# Patient Record
Sex: MALE | Race: WHITE | NOT HISPANIC OR LATINO | Employment: FULL TIME | URBAN - METROPOLITAN AREA
[De-identification: names, ages, dates, MRNs, and addresses within clinical notes are randomized per-mention and may not be internally consistent; named-entity substitution may affect disease eponyms.]

---

## 2017-03-06 DIAGNOSIS — J20.9 ACUTE BRONCHITIS WITH SYMPTOMS > 10 DAYS: ICD-10-CM

## 2017-03-06 NOTE — TELEPHONE ENCOUNTER
albuterol       Last Written Prescription Date: 01/11/16  Last Fill Quantity: 1 inhaler, # refills: 11    Last Office Visit with G, P or [facility] prescribing provider:  07/06/16   Future Office Visit:       Date of Last Asthma Action Plan Letter:   There are no preventive care reminders to display for this patient.   Asthma Control Test: No flowsheet data found.    Date of Last Spirometry Test:   No results found for this or any previous visit.

## 2017-03-06 NOTE — TELEPHONE ENCOUNTER
Routing refill request to provider for review/approval because:  Drug not on the [facility] refill protocol  For diagnosis listed

## 2017-03-08 RX ORDER — ALBUTEROL SULFATE 90 UG/1
1-2 AEROSOL, METERED RESPIRATORY (INHALATION) EVERY 4 HOURS PRN
Qty: 1 INHALER | Refills: 11 | Status: SHIPPED | OUTPATIENT
Start: 2017-03-08 | End: 2018-07-13

## 2017-05-05 ENCOUNTER — TRANSFERRED RECORDS (OUTPATIENT)
Dept: HEALTH INFORMATION MANAGEMENT | Facility: CLINIC | Age: 57
End: 2017-05-05

## 2017-06-25 DIAGNOSIS — E78.1 HYPERTRIGLYCERIDEMIA: ICD-10-CM

## 2017-06-26 NOTE — TELEPHONE ENCOUNTER
fenofibrate 160 MG tablet       Last Written Prescription Date: 7/06/2016  Last Fill Quantity: 90, # refills: 3    Last Office Visit with G, P or [facility] prescribing provider:  7/06/2016   Future Office Visit:    Next 5 appointments (look out 90 days)     Jul 10, 2017  9:00 AM CDT   PHYSICAL with Jose Maxwell MD   [facility]    [address] [place] [address] [phone]                  Cholesterol   Date Value Ref Range Status   07/06/2016 150 <200 mg/dL Final     HDL Cholesterol   Date Value Ref Range Status   07/06/2016 60 >39 mg/dL Final     LDL Cholesterol Calculated   Date Value Ref Range Status   07/06/2016 73 <100 mg/dL Final     Comment:     Desirable:       <100 mg/dl     Triglycerides   Date Value Ref Range Status   07/06/2016 83 <150 mg/dL Final     Cholesterol/HDL Ratio   Date Value Ref Range Status   09/04/2015 3.0 0.0 - 5.0 Final     ALT   Date Value Ref Range Status   07/06/2016 33 0 - 70 U/L Final

## 2017-06-27 RX ORDER — FENOFIBRATE 160 MG/1
TABLET ORAL
Qty: 90 TABLET | Refills: 0 | Status: SHIPPED | OUTPATIENT
Start: 2017-06-27 | End: 2017-07-10

## 2017-06-29 DIAGNOSIS — E11.9 TYPE 2 DIABETES MELLITUS WITHOUT COMPLICATION, WITHOUT LONG-TERM CURRENT USE OF INSULIN (H): Primary | ICD-10-CM

## 2017-06-29 NOTE — TELEPHONE ENCOUNTER
Routing refill request to provider for review/approval because:  Labs not current:  a1c  Patient needs to be seen because it has been more than 1 year since last office visit.  Does have upcoming appt

## 2017-06-29 NOTE — TELEPHONE ENCOUNTER
metFORMIN (GLUCOPHAGE) 1000 MG tablet         Last Written Prescription Date: 7/06/2016  Last Fill Quantity: 180, # refills: 3  Last Office Visit with [facility], [facility] or  [facility] prescribing provider:  7/06/2016   Next 5 appointments (look out 90 days)     Jul 10, 2017  9:00 AM CDT   PHYSICAL with Jose Maxwell MD   [facility] ([facility])    [address] [place] [address] [phone]                   BP Readings from Last 3 Encounters:   07/06/16 126/78   11/05/15 146/89   09/09/15 132/62     Lab Results   Component Value Date    MICROL 6 07/06/2016     Lab Results   Component Value Date    UMALCR 7.05 07/06/2016     Creatinine   Date Value Ref Range Status   07/06/2016 0.86 0.66 - 1.25 mg/dL Final   ]  GFR Estimate   Date Value Ref Range Status   07/06/2016 >90  Non  GFR Calc   >60 mL/min/1.7m2 Final   09/04/2015 88 >60 mL/min/1.7m2 Final     Comment:     Non  GFR Calc   04/17/2015 >90  Non  GFR Calc   >60 mL/min/1.7m2 Final     GFR Estimate If Black   Date Value Ref Range Status   07/06/2016 >90   GFR Calc   >60 mL/min/1.7m2 Final   09/04/2015 >90   GFR Calc   >60 mL/min/1.7m2 Final   04/17/2015 >90   GFR Calc   >60 mL/min/1.7m2 Final     Lab Results   Component Value Date    CHOL 150 07/06/2016     Lab Results   Component Value Date    HDL 60 07/06/2016     Lab Results   Component Value Date    LDL 73 07/06/2016     Lab Results   Component Value Date    TRIG 83 07/06/2016     Lab Results   Component Value Date    CHOLHDLRATIO 3.0 09/04/2015     Lab Results   Component Value Date    AST 18 07/06/2016     Lab Results   Component Value Date    ALT 33 07/06/2016     Lab Results   Component Value Date    A1C 6.1 07/06/2016    A1C 6.0 09/04/2015    A1C 6.3 04/17/2015    A1C 6.1 06/11/2014    A1C 7.6 03/03/2014     Potassium   Date Value Ref Range Status    07/06/2016 4.8 3.4 - 5.3 mmol/L Final

## 2017-07-10 ENCOUNTER — OFFICE VISIT (OUTPATIENT)
Dept: INTERNAL MEDICINE | Facility: CLINIC | Age: 57
End: 2017-07-10
Payer: COMMERCIAL

## 2017-07-10 VITALS
SYSTOLIC BLOOD PRESSURE: 128 MMHG | DIASTOLIC BLOOD PRESSURE: 76 MMHG | OXYGEN SATURATION: 98 % | TEMPERATURE: 98.6 F | WEIGHT: 149.5 LBS | HEIGHT: 67 IN | HEART RATE: 81 BPM | BODY MASS INDEX: 23.46 KG/M2

## 2017-07-10 DIAGNOSIS — Z00.00 ENCOUNTER FOR ROUTINE ADULT HEALTH EXAMINATION WITHOUT ABNORMAL FINDINGS: Primary | ICD-10-CM

## 2017-07-10 DIAGNOSIS — E78.1 HYPERTRIGLYCERIDEMIA: ICD-10-CM

## 2017-07-10 DIAGNOSIS — E11.9 TYPE 2 DIABETES MELLITUS WITHOUT COMPLICATION, WITHOUT LONG-TERM CURRENT USE OF INSULIN (H): ICD-10-CM

## 2017-07-10 DIAGNOSIS — Z12.5 SCREENING FOR PROSTATE CANCER: ICD-10-CM

## 2017-07-10 LAB
CREAT UR-MCNC: 77 MG/DL
HBA1C MFR BLD: 6 % (ref 4.3–6)
MICROALBUMIN UR-MCNC: 6 MG/L
MICROALBUMIN/CREAT UR: 8.12 MG/G CR (ref 0–17)

## 2017-07-10 PROCEDURE — 82043 UR ALBUMIN QUANTITATIVE: CPT | Performed by: INTERNAL MEDICINE

## 2017-07-10 PROCEDURE — 80061 LIPID PANEL: CPT | Performed by: INTERNAL MEDICINE

## 2017-07-10 PROCEDURE — 99396 PREV VISIT EST AGE 40-64: CPT | Performed by: INTERNAL MEDICINE

## 2017-07-10 PROCEDURE — 84443 ASSAY THYROID STIM HORMONE: CPT | Performed by: INTERNAL MEDICINE

## 2017-07-10 PROCEDURE — G0103 PSA SCREENING: HCPCS | Performed by: INTERNAL MEDICINE

## 2017-07-10 PROCEDURE — 36415 COLL VENOUS BLD VENIPUNCTURE: CPT | Performed by: INTERNAL MEDICINE

## 2017-07-10 PROCEDURE — 80053 COMPREHEN METABOLIC PANEL: CPT | Performed by: INTERNAL MEDICINE

## 2017-07-10 PROCEDURE — 83036 HEMOGLOBIN GLYCOSYLATED A1C: CPT | Performed by: INTERNAL MEDICINE

## 2017-07-10 RX ORDER — FENOFIBRATE 160 MG/1
160 TABLET ORAL DAILY
Qty: 90 TABLET | Refills: 3 | Status: SHIPPED | OUTPATIENT
Start: 2017-07-10 | End: 2018-09-30

## 2017-07-10 NOTE — MR AVS SNAPSHOT
After Visit Summary   7/10/2017    Lencho Patel    MRN: [id number]           Patient Information     Date Of Birth          1960        Visit Information        Provider Department      7/10/2017 9:00 AM Jose Maxwell MD [facility]        Today's Diagnoses     Encounter for routine adult health examination without abnormal findings    -  1    Hypertriglyceridemia        Type 2 diabetes mellitus without complication, without long-term current use of insulin (H)        Screening for prostate cancer          Care Instructions      Preventive Health Recommendations  Male Ages 50 - 64    Yearly exam:             See your health care provider every year in order to  o   Review health changes.   o   Discuss preventive care.    o   Review your medicines if your doctor has prescribed any.     Have a cholesterol test every 5 years, or more frequently if you are at risk for high cholesterol/heart disease.     Have a diabetes test (fasting glucose) every three years. If you are at risk for diabetes, you should have this test more often.     Have a colonoscopy at age 50, or have a yearly FIT test (stool test). These exams will check for colon cancer.      Talk with your health care provider about whether or not a prostate cancer screening test (PSA) is right for you.    You should be tested each year for STDs (sexually transmitted diseases), if you re at risk.     Shots: Get a flu shot each year. Get a tetanus shot every 10 years.     Nutrition:    Eat at least 5 servings of fruits and vegetables daily.     Eat whole-grain bread, whole-wheat pasta and brown rice instead of white grains and rice.     Talk to your provider about Calcium and Vitamin D.     Lifestyle    Exercise for at least 150 minutes a week (30 minutes a day, 5 days a week). This will help you control your weight and prevent disease.     Limit alcohol to one drink per day.     No smoking.     Wear sunscreen to  "prevent skin cancer.     See your dentist every six months for an exam and cleaning.     See your eye doctor every 1 to 2 years.            Follow-ups after your visit        Who to contact     If you have questions or need follow up information about today's clinic visit or your schedule please contact [facility] directly at [phone].  Normal or non-critical lab and imaging results will be communicated to you by MyChart, letter or phone within 4 business days after the clinic has received the results. If you do not hear from us within 7 days, please contact the clinic through [employer] or phone. If you have a critical or abnormal lab result, we will notify you by phone as soon as possible.  Submit refill requests through [employer] or call your pharmacy and they will forward the refill request to us. Please allow 3 business days for your refill to be completed.          Additional Information About Your Visit        [employer] Information     [employer] lets you send messages to your doctor, view your test results, renew your prescriptions, schedule appointments and more. To sign up, go to [email or web address] . Click on \"Log in\" on the left side of the screen, which will take you to the Welcome page. Then click on \"Sign up Now\" on the right side of the page.     You will be asked to enter the access code listed below, as well as some personal information. Please follow the directions to create your username and password.     Your access code is: [email or web address]  Expires: 10/8/2017  9:51 AM     Your access code will  in 90 days. If you need help or a new code, please call your [place] clinic or [phone].        Care EveryWhere ID     This is your Care EveryWhere ID. This could be used by other organizations to access your [place] medical records  [id number]        Your Vitals Were     Pulse Temperature Height Pulse Oximetry BMI (Body Mass Index)       81 98.6  F (37  C) (Oral) 5' 7\" " (1.702 m) 98% 23.42 kg/m2        Blood Pressure from Last 3 Encounters:   07/10/17 128/76   07/06/16 126/78   11/05/15 146/89    Weight from Last 3 Encounters:   07/10/17 149 lb 8 oz (67.8 kg)   07/06/16 153 lb (69.4 kg)   11/05/15 158 lb (71.7 kg)              We Performed the Following     Albumin Random Urine Quantitative     Comprehensive metabolic panel (BMP + Alb, Alk Phos, ALT, AST, Total. Bili, TP)     HEMOGLOBIN A1C     Lipid Profile with reflex to direct LDL     PSA, screen     TSH with free T4 reflex          Today's Medication Changes          These changes are accurate as of: 7/10/17  9:51 AM.  If you have any questions, ask your nurse or doctor.               These medicines have changed or have updated prescriptions.        Dose/Directions    fenofibrate 160 MG tablet   This may have changed:  See the new instructions.   Used for:  Hypertriglyceridemia   Changed by:  Jose Maxwell MD        Dose:  160 mg   Take 1 tablet (160 mg) by mouth daily   Quantity:  90 tablet   Refills:  3         Stop taking these medicines if you haven't already. Please contact your care team if you have questions.     nystatin [id number] UNIT/ML suspension   Commonly known as:  MYCOSTATIN   Stopped by:  Jose Maxwell MD                Where to get your medicines      These medications were sent to [facility] [id number] [place] [address] [address] AT [facility] [address]  [address], [facility] [address]     Phone:  [phone]     fenofibrate 160 MG tablet                Primary Care Provider Office Phone # Fax #    Jose Maxwell [phone]       [facility] [address]  [facility] [address]        Equal Access to Services     MIGDALIA SALINAS AH: Hadii dima richeyo Socolin, waaxda luqadaha, qaybta kaalmada adeegyada, taqueria cedillo. So [facility] [phone].    ATENCIÓN: Si soto grubbs garnica  disposición servicios gratuitos de asistencia lingüística. Rico luna [phone].    We comply with applicable federal civil rights laws and [place] laws. We do not discriminate on the basis of race, color, national origin, age, disability sex, sexual orientation or gender identity.            Thank you!     Thank you for choosing [facility]  for your care. Our goal is always to provide you with excellent care. Hearing back from our patients is one way we can continue to improve our services. Please take a few minutes to complete the written survey that you may receive in the mail after your visit with us. Thank you!             Your Updated Medication List - Protect others around you: Learn how to safely use, store and throw away your medicines at www.disposemymeds.org.          This list is accurate as of: 7/10/17  9:51 AM.  Always use your most recent med list.                   Brand Name Dispense Instructions for use Diagnosis    * ACE NOT PRESCRIBED (INTENTIONAL)     0 each    1 each ACE Inhibitor not prescribed due to Other:        albuterol 108 (90 BASE) MCG/ACT Inhaler    PROAIR HFA/PROVENTIL HFA/VENTOLIN HFA    1 Inhaler    Inhale 1-2 puffs into the lungs every 4 hours as needed for shortness of breath / dyspnea    Acute bronchitis with symptoms > 10 days       aspirin 81 MG tablet     30 tablet    Take 1 tablet (81 mg) by mouth daily        blood glucose calibration solution     1 each    1 Bottle by In Vitro route as needed    Type 2 diabetes, HbA1c goal < 7% (H)       blood glucose monitoring lancets     1 each    1 each daily    Type 2 diabetes, HbA1c goal < 7% (H)       blood glucose monitoring test strip    ACCU-CHEK SMARTVIEW    1 Box    1 strip by In Vitro route 2 times daily    Type 2 diabetes, HbA1c goal < 7% (H)       fenofibrate 160 MG tablet     90 tablet    Take 1 tablet (160 mg) by mouth daily    Hypertriglyceridemia       metFORMIN 1000 MG tablet    GLUCOPHAGE     180 tablet    Take 1 tablet (1,000 mg) by mouth 2 times daily (with meals)    Type 2 diabetes mellitus without complication, without long-term current use of insulin (H)       * STATIN NOT PRESCRIBED (INTENTIONAL)     0 each    Statin not prescribed intentionally due to Other:        * Notice:  This list has 2 medication(s) that are the same as other medications prescribed for you. Read the directions carefully, and ask your doctor or other care provider to review them with you.

## 2017-07-10 NOTE — LETTER
[address]  [address]  [phone]        Lencho Jorge  [address] [place]  [facility] [address]      7/12/2017        Dear Mr. Patel:    I am writing to inform you of the results of the laboratory tests you had done recently.    Total Cholesterol:   Lab Results   Component Value Date    CHOL 156 07/10/2017          (Recommended: below 200)        HDL (good) Cholesterol :   Lab Results   Component Value Date    HDL 62 07/10/2017         (Recommended: 40 or more)  LDL (bad) Cholesterol:    Lab Results   Component Value Date    LDL 76 07/10/2017          (Recommended: below 130, below 100 if heart disease or diabetes is diagnosed)   Triglycerides:    Lab Results   Component Value Date    TRIG 92 07/10/2017       (Recommended: below 180)    Slight variations and daily fluctuations are normal and should cause little concern. Overall patterns and values are most significant.  An elevated cholesterol is one factor in increasing your risk of heart and vascular disease.    Additional results of your recent labs are as noted.   Liver function: NORMAL  Kidney function: NORMAL  PSA: NORMAL  Thyroid function: NORMAL  Electrolytes: NORMAL  Glucose: STABLE FROM PREVIOUSLY  Hgb A1C: NORMAL      All of your lab testing looks good.    Thank you for allowing me to participate in your care. If you have any further questions or problems, please contact me via our nurse line at [phone].    Sincerely,        Chuck Maxwell M.D.  Department of Internal Medicine  [facility]

## 2017-07-10 NOTE — NURSING NOTE
"Chief Complaint   Patient presents with     Physical       Initial /76 (BP Location: Left arm, Patient Position: Chair, Cuff Size: Adult Regular)  Pulse 81  Temp 98.6  F (37  C) (Oral)  Ht 5' 7\" (1.702 m)  Wt 149 lb 8 oz (67.8 kg)  SpO2 98%  BMI 23.42 kg/m2 Estimated body mass index is 23.42 kg/(m^2) as calculated from the following:    Height as of this encounter: 5' 7\" (1.702 m).    Weight as of this encounter: 149 lb 8 oz (67.8 kg).  Medication Reconciliation: complete    "

## 2017-07-10 NOTE — PROGRESS NOTES
SUBJECTIVE:   CC: Lencho Patel is an 57 year old male who presents for preventative health visit.     Healthy Habits:    Do you get at least three servings of calcium containing foods daily (dairy, green leafy vegetables, etc.)? yes    Amount of exercise or daily activities, outside of work: 5 day(s) per week    Problems taking medications regularly No    Medication side effects: No    Have you had an eye exam in the past two years? no    Do you see a dentist twice per year? yes    Do you have sleep apnea, excessive snoring or daytime drowsiness?yes            Today's PHQ-2 Score:   PHQ-2 ( 1999 [employer]) 7/10/2017 7/6/2016   Q1: Little interest or pleasure in doing things 0 0   Q2: Feeling down, depressed or hopeless 0 0   PHQ-2 Score 0 0       Abuse: Current or Past(Physical, Sexual or Emotional)- No  Do you feel safe in your environment - Yes    Social History   Substance Use Topics     Smoking status: Former Smoker     Packs/day: 0.50     Years: 15.00     Types: Cigarettes     Quit date: 2/19/2014     Smokeless tobacco: Never Used     Alcohol use 0.0 oz/week     0 Standard drinks or equivalent per week      Comment: Socially     The patient does not drink >3 drinks per day nor >7 drinks per week.    Last PSA:   PSA   Date Value Ref Range Status   04/17/2015 0.49 0 - 4 ug/L Final       Reviewed orders with patient. Reviewed health maintenance and updated orders accordingly - Yes      Reviewed and updated as needed this visit by clinical staff  Tobacco  Allergies  Med Hx  Surg Hx  Fam Hx  Soc Hx        Reviewed and updated as needed this visit by Provider          Patient continues on metformin monotherapy for type 2 diabetes, as well as fenofibrate for hypertriglyceridemia.  He is due for surveillance labs today and has been fasting.  No other acute issues to discuss today.      ROS:  C: NEGATIVE for fever, chills, change in weight  I: NEGATIVE for worrisome rashes, moles or lesions  E: NEGATIVE for vision  "changes or irritation  ENT: NEGATIVE for ear, mouth and throat problems  R: NEGATIVE for significant cough or SOB  CV: NEGATIVE for chest pain, palpitations or peripheral edema  GI: NEGATIVE for nausea, abdominal pain, heartburn, or change in bowel habits   male: negative for dysuria, hematuria, decreased urinary stream, erectile dysfunction, urethral discharge  M: NEGATIVE for significant arthralgias or myalgia  N: NEGATIVE for weakness, dizziness or paresthesias  P: NEGATIVE for changes in mood or affect    OBJECTIVE:   /76 (BP Location: Left arm, Patient Position: Chair, Cuff Size: Adult Regular)  Pulse 81  Temp 98.6  F (37  C) (Oral)  Ht 5' 7\" (1.702 m)  Wt 149 lb 8 oz (67.8 kg)  SpO2 98%  BMI 23.42 kg/m2  EXAM:  GENERAL: healthy, alert and no distress  NECK: no adenopathy, no asymmetry, masses, or scars and thyroid normal to palpation  RESP: lungs clear to auscultation - no rales, rhonchi or wheezes  CV: regular rate and rhythm, normal S1 S2, no S3 or S4, no murmur, click or rub, no peripheral edema and peripheral pulses strong  ABDOMEN: soft, nontender, no hepatosplenomegaly, no masses and bowel sounds normal  MS: no gross musculoskeletal defects noted, no edema    ASSESSMENT/PLAN:   1. Encounter for routine adult health examination without abnormal findings  Discussed cardiac disease risk factor modification including screening for and treating HTN, lipids, DM, and smoking cessation.  Also discussed age appropriate cancer screening recommendations including testicular, prostate, colon and lung cancer as dictated by age group.  Recommended low fat, low salt diet and moderation in any alcohol intake.  Recommended always using seatbelts when in a car.  Recommended never driving after drinking or riding with someone who has been drinking as well.     2. Hypertriglyceridemia  Re-check today  - fenofibrate 160 MG tablet; Take 1 tablet (160 mg) by mouth daily  Dispense: 90 tablet; Refill: 3    3. Type " "2 diabetes mellitus without complication, without long-term current use of insulin (H)    - HEMOGLOBIN A1C  - Lipid Profile with reflex to direct LDL  - Comprehensive metabolic panel (BMP + Alb, Alk Phos, ALT, AST, Total. Bili, TP)  - Albumin Random Urine Quantitative  - TSH with free T4 reflex    4. Screening for prostate cancer    - PSA, screen    COUNSELING:  Reviewed preventive health counseling, as reflected in patient instructions         reports that he quit smoking about 3 years ago. His smoking use included Cigarettes. He has a 7.50 pack-year smoking history. He has never used smokeless tobacco.    Estimated body mass index is 23.42 kg/(m^2) as calculated from the following:    Height as of this encounter: 5' 7\" (1.702 m).    Weight as of this encounter: 149 lb 8 oz (67.8 kg).       Counseling Resources:  ATP IV Guidelines  Pooled Cohorts Equation Calculator  FRAX Risk Assessment  ICSI Preventive Guidelines  Dietary Guidelines for Americans, 2010  USDA's MyPlate  ASA Prophylaxis  Lung CA Screening    Jose Maxwell MD  [facility]  "

## 2017-07-11 LAB
ALBUMIN SERPL-MCNC: 4.2 G/DL (ref 3.4–5)
ALP SERPL-CCNC: 46 U/L (ref 40–150)
ALT SERPL W P-5'-P-CCNC: 28 U/L (ref 0–70)
ANION GAP SERPL CALCULATED.3IONS-SCNC: 6 MMOL/L (ref 3–14)
AST SERPL W P-5'-P-CCNC: 20 U/L (ref 0–45)
BILIRUB SERPL-MCNC: 0.4 MG/DL (ref 0.2–1.3)
BUN SERPL-MCNC: 15 MG/DL (ref 7–30)
CALCIUM SERPL-MCNC: 9.2 MG/DL (ref 8.5–10.1)
CHLORIDE SERPL-SCNC: 105 MMOL/L (ref 94–109)
CHOLEST SERPL-MCNC: 156 MG/DL
CO2 SERPL-SCNC: 28 MMOL/L (ref 20–32)
CREAT SERPL-MCNC: 0.87 MG/DL (ref 0.66–1.25)
GFR SERPL CREATININE-BSD FRML MDRD: 90 ML/MIN/1.7M2
GLUCOSE SERPL-MCNC: 121 MG/DL (ref 70–99)
HDLC SERPL-MCNC: 62 MG/DL
LDLC SERPL CALC-MCNC: 76 MG/DL
NONHDLC SERPL-MCNC: 94 MG/DL
POTASSIUM SERPL-SCNC: 4.8 MMOL/L (ref 3.4–5.3)
PROT SERPL-MCNC: 7.9 G/DL (ref 6.8–8.8)
PSA SERPL-ACNC: 0.58 UG/L (ref 0–4)
SODIUM SERPL-SCNC: 139 MMOL/L (ref 133–144)
TRIGL SERPL-MCNC: 92 MG/DL
TSH SERPL DL<=0.005 MIU/L-ACNC: 0.54 MU/L (ref 0.4–4)

## 2018-07-03 DIAGNOSIS — E11.9 TYPE 2 DIABETES MELLITUS WITHOUT COMPLICATION, WITHOUT LONG-TERM CURRENT USE OF INSULIN (H): ICD-10-CM

## 2018-07-03 NOTE — TELEPHONE ENCOUNTER
"Requested Prescriptions   Pending Prescriptions Disp Refills     metFORMIN (GLUCOPHAGE) 1000 MG tablet [Pharmacy Med Name: METFORMIN 1000MG TABLETS] 180 tablet 0     Sig: TAKE 1 TABLET(1000 MG) BY MOUTH TWICE DAILY WITH MEALS    Biguanide Agents Failed    7/3/2018  5:01 PM       Failed - Blood pressure less than 140/90 in past 6 months    BP Readings from Last 3 Encounters:   07/10/17 128/76   07/06/16 126/78   11/05/15 146/89                Failed - Patient has documented A1c within the specified period of time.    If HgbA1C is 8 or greater, it needs to be on file within the past 3 months.  If less than 8, must be on file within the past 6 months.     Recent Labs   Lab Test  07/10/17   1009   A1C  6.0            Passed - Patient has documented LDL within the past 12 mos.    Recent Labs   Lab Test  07/10/17   1009   LDL  76            Passed - Patient has had a Microalbumin in the past 12 mos.    Recent Labs   Lab Test  07/10/17   1013   MICROL  6   UMALCR  8.12            Passed - Patient is age 10 or older       Passed - Patient's CR is NOT>1.4 OR Patient's EGFR is NOT<45 within past 12 mos.    Recent Labs   Lab Test  07/10/17   1009   GFRESTIMATED  90   GFRESTBLACK  >90   GFR Calc         Recent Labs   Lab Test  07/10/17   1009   CR  0.87            Passed - Patient does NOT have a diagnosis of CHF.       Passed - Recent (6 mo) or future (30 days) visit within the authorizing provider's specialty    Patient had office visit in the last 6 months or has a visit in the next 30 days with authorizing provider or within the authorizing provider's specialty.  See \"Patient Info\" tab in inbasket, or \"Choose Columns\" in Meds & Orders section of the refill encounter.            Last Written Prescription Date:  7/1/17  Last Fill Quantity: 180,  # refills: 3   Last office visit: 7/10/2017 with prescribing provider:  7/10/17   Future Office Visit:   Next 5 appointments (look out 90 days)     Jul 13, 2018  3:15 " PM CDT   PHYSICAL with Jose Maxwell MD   [facility]    [address] [address] [phone]

## 2018-07-13 ENCOUNTER — OFFICE VISIT (OUTPATIENT)
Dept: INTERNAL MEDICINE | Facility: CLINIC | Age: 58
End: 2018-07-13
Payer: COMMERCIAL

## 2018-07-13 VITALS
BODY MASS INDEX: 23.31 KG/M2 | TEMPERATURE: 98 F | DIASTOLIC BLOOD PRESSURE: 70 MMHG | HEART RATE: 78 BPM | RESPIRATION RATE: 16 BRPM | WEIGHT: 148.8 LBS | SYSTOLIC BLOOD PRESSURE: 120 MMHG | OXYGEN SATURATION: 98 %

## 2018-07-13 DIAGNOSIS — Z00.00 ENCOUNTER FOR ROUTINE ADULT HEALTH EXAMINATION WITHOUT ABNORMAL FINDINGS: Primary | ICD-10-CM

## 2018-07-13 DIAGNOSIS — Z11.4 SCREENING FOR HIV (HUMAN IMMUNODEFICIENCY VIRUS): ICD-10-CM

## 2018-07-13 DIAGNOSIS — Z13.89 SCREENING FOR DIABETIC PERIPHERAL NEUROPATHY: ICD-10-CM

## 2018-07-13 DIAGNOSIS — J20.9 ACUTE BRONCHITIS WITH SYMPTOMS > 10 DAYS: ICD-10-CM

## 2018-07-13 DIAGNOSIS — E11.9 TYPE 2 DIABETES MELLITUS WITHOUT COMPLICATION, WITHOUT LONG-TERM CURRENT USE OF INSULIN (H): ICD-10-CM

## 2018-07-13 LAB
ALBUMIN SERPL-MCNC: 4 G/DL (ref 3.4–5)
ALP SERPL-CCNC: 44 U/L (ref 40–150)
ALT SERPL W P-5'-P-CCNC: 33 U/L (ref 0–70)
ANION GAP SERPL CALCULATED.3IONS-SCNC: 5 MMOL/L (ref 3–14)
AST SERPL W P-5'-P-CCNC: 20 U/L (ref 0–45)
BILIRUB SERPL-MCNC: 0.8 MG/DL (ref 0.2–1.3)
BUN SERPL-MCNC: 9 MG/DL (ref 7–30)
CALCIUM SERPL-MCNC: 8.9 MG/DL (ref 8.5–10.1)
CHLORIDE SERPL-SCNC: 104 MMOL/L (ref 94–109)
CHOLEST SERPL-MCNC: 149 MG/DL
CO2 SERPL-SCNC: 31 MMOL/L (ref 20–32)
CREAT SERPL-MCNC: 0.89 MG/DL (ref 0.66–1.25)
GFR SERPL CREATININE-BSD FRML MDRD: 87 ML/MIN/1.7M2
GLUCOSE SERPL-MCNC: 106 MG/DL (ref 70–99)
HBA1C MFR BLD: 6.3 % (ref 0–5.6)
HDLC SERPL-MCNC: 62 MG/DL
LDLC SERPL CALC-MCNC: 68 MG/DL
NONHDLC SERPL-MCNC: 87 MG/DL
POTASSIUM SERPL-SCNC: 4.1 MMOL/L (ref 3.4–5.3)
PROT SERPL-MCNC: 7.6 G/DL (ref 6.8–8.8)
SODIUM SERPL-SCNC: 140 MMOL/L (ref 133–144)
TRIGL SERPL-MCNC: 93 MG/DL

## 2018-07-13 PROCEDURE — 36415 COLL VENOUS BLD VENIPUNCTURE: CPT | Performed by: INTERNAL MEDICINE

## 2018-07-13 PROCEDURE — 82043 UR ALBUMIN QUANTITATIVE: CPT | Performed by: INTERNAL MEDICINE

## 2018-07-13 PROCEDURE — 99396 PREV VISIT EST AGE 40-64: CPT | Performed by: INTERNAL MEDICINE

## 2018-07-13 PROCEDURE — 99207 C FOOT EXAM  NO CHARGE: CPT | Mod: 25 | Performed by: INTERNAL MEDICINE

## 2018-07-13 PROCEDURE — 80061 LIPID PANEL: CPT | Performed by: INTERNAL MEDICINE

## 2018-07-13 PROCEDURE — 80053 COMPREHEN METABOLIC PANEL: CPT | Performed by: INTERNAL MEDICINE

## 2018-07-13 PROCEDURE — 87389 HIV-1 AG W/HIV-1&-2 AB AG IA: CPT | Performed by: INTERNAL MEDICINE

## 2018-07-13 PROCEDURE — 83036 HEMOGLOBIN GLYCOSYLATED A1C: CPT | Performed by: INTERNAL MEDICINE

## 2018-07-13 RX ORDER — ALBUTEROL SULFATE 90 UG/1
1-2 AEROSOL, METERED RESPIRATORY (INHALATION) EVERY 4 HOURS PRN
Qty: 1 INHALER | Refills: 11 | Status: SHIPPED | OUTPATIENT
Start: 2018-07-13 | End: 2019-08-19

## 2018-07-13 NOTE — MR AVS SNAPSHOT
"              After Visit Summary   7/13/2018    Lencho Patel    MRN: [id number]           Patient Information     Date Of Birth          1960        Visit Information        Provider Department      7/13/2018 3:15 PM Jose Maxwell MD [facility]        Today's Diagnoses     Encounter for routine adult health examination without abnormal findings    -  1    Type 2 diabetes mellitus without complication, without long-term current use of insulin (H)        Screening for diabetic peripheral neuropathy        Screening for HIV (human immunodeficiency virus)        Acute bronchitis with symptoms > 10 days           Follow-ups after your visit        Who to contact     If you have questions or need follow up information about today's clinic visit or your schedule please contact [facility] directly at [phone].  Normal or non-critical lab and imaging results will be communicated to you by MyChart, letter or phone within 4 business days after the clinic has received the results. If you do not hear from us within 7 days, please contact the clinic through MyChart or phone. If you have a critical or abnormal lab result, we will notify you by phone as soon as possible.  Submit refill requests through JiaThis or call your pharmacy and they will forward the refill request to us. Please allow 3 business days for your refill to be completed.          Additional Information About Your Visit        MyChart Information     JiaThis lets you send messages to your doctor, view your test results, renew your prescriptions, schedule appointments and more. To sign up, go to [email or web address] . Click on \"Log in\" on the left side of the screen, which will take you to the Welcome page. Then click on \"Sign up Now\" on the right side of the page.     You will be asked to enter the access code listed below, as well as some personal information. Please follow the directions to " create your username and password.     Your access code is: [email or web address]  Expires: 10/23/2018 11:04 AM     Your access code will  in 90 days. If you need help or a new code, please call your [place] clinic or [phone].        Care EveryWhere ID     This is your Care EveryWhere ID. This could be used by other organizations to access your [place] medical records  [id number]        Your Vitals Were     Pulse Temperature Respirations Pulse Oximetry BMI (Body Mass Index)       78 98  F (36.7  C) (Oral) 16 98% 23.31 kg/m2        Blood Pressure from Last 3 Encounters:   18 120/70   07/10/17 128/76   16 126/78    Weight from Last 3 Encounters:   18 148 lb 12.8 oz (67.5 kg)   07/10/17 149 lb 8 oz (67.8 kg)   16 153 lb (69.4 kg)              We Performed the Following     Albumin Random Urine Quantitative with Creat Ratio     Comprehensive metabolic panel (BMP + Alb, Alk Phos, ALT, AST, Total. Bili, TP)     FOOT EXAM  NO CHARGE [39882.114]     HEMOGLOBIN A1C     HIV Screening     Lipid panel reflex to direct LDL Fasting          Today's Medication Changes          These changes are accurate as of 18 11:59 PM.  If you have any questions, ask your nurse or doctor.               These medicines have changed or have updated prescriptions.        Dose/Directions    metFORMIN 1000 MG tablet   Commonly known as:  GLUCOPHAGE   This may have changed:  See the new instructions.   Used for:  Type 2 diabetes mellitus without complication, without long-term current use of insulin (H)   Changed by:  Jose Maxwell MD        Dose:  1000 mg   Take 1 tablet (1,000 mg) by mouth 2 times daily (with meals)   Quantity:  180 tablet   Refills:  3            Where to get your medicines      These medications were sent to [facility] [address] [place] [address] - [address] AT [facility] [place]  [address], [facility] [address]     Phone:  [phone]      albuterol 108 (90 Base) MCG/ACT Inhaler    metFORMIN 1000 MG tablet                Primary Care Provider Office Phone # Fax #    Jose Maxwell [phone] [address] [address]        Equal Access to Services     MIGDALIA SALINAS : Hadii aad ku hadasho Soomaali, waaxda luqadaha, qaybta kaalmada adeegyada, waxay david valerin ariana laura shahzad cedillo. So [facility] [phone].    ATENCIÓN: Si habla español, tiene a garnica disposición servicios gratuitos de asistencia lingüística. Llame al [phone].    We comply with applicable federal civil rights laws and [place] laws. We do not discriminate on the basis of race, color, national origin, age, disability, sex, sexual orientation, or gender identity.            Thank you!     Thank you for choosing [facility]  for your care. Our goal is always to provide you with excellent care. Hearing back from our patients is one way we can continue to improve our services. Please take a few minutes to complete the written survey that you may receive in the mail after your visit with us. Thank you!             Your Updated Medication List - Protect others around you: Learn how to safely use, store and throw away your medicines at www.disposemymeds.org.          This list is accurate as of 7/13/18 11:59 PM.  Always use your most recent med list.                   Brand Name Dispense Instructions for use Diagnosis    * ACE NOT PRESCRIBED (INTENTIONAL)     0 each    1 each ACE Inhibitor not prescribed due to Other:        albuterol 108 (90 Base) MCG/ACT Inhaler    PROAIR HFA/PROVENTIL HFA/VENTOLIN HFA    1 Inhaler    Inhale 1-2 puffs into the lungs every 4 hours as needed for shortness of breath / dyspnea    Acute bronchitis with symptoms > 10 days       aspirin 81 MG tablet     30 tablet    Take 1 tablet (81 mg) by mouth daily        blood glucose calibration solution     1 each    1 Bottle by In Vitro route as needed     Type 2 diabetes, HbA1c goal < 7% (H)       blood glucose monitoring lancets     1 each    1 each daily    Type 2 diabetes, HbA1c goal < 7% (H)       blood glucose monitoring test strip    ACCU-CHEK SMARTVIEW    1 Box    1 strip by In Vitro route 2 times daily    Type 2 diabetes, HbA1c goal < 7% (H)       fenofibrate 160 MG tablet     90 tablet    Take 1 tablet (160 mg) by mouth daily    Hypertriglyceridemia       metFORMIN 1000 MG tablet    GLUCOPHAGE    180 tablet    Take 1 tablet (1,000 mg) by mouth 2 times daily (with meals)    Type 2 diabetes mellitus without complication, without long-term current use of insulin (H)       * STATIN NOT PRESCRIBED (INTENTIONAL)     0 each    Statin not prescribed intentionally due to Other:        * Notice:  This list has 2 medication(s) that are the same as other medications prescribed for you. Read the directions carefully, and ask your doctor or other care provider to review them with you.

## 2018-07-13 NOTE — LETTER
7/20/2018         Lencho Patel  [address] [place]  [facility] [address]            Dear Mr. Patel,    I am writing to inform you of the lab tests you had performed recently.      Your cholesterol results are as follows:    Lab Results   Component Value Date    CHOL 149 07/13/2018    HDL 62 07/13/2018    LDL 68 07/13/2018    TRIG 93 07/13/2018       Additional lab results are as follows:    Liver function: NORMAL  Kidney function: NORMAL  Urine: NORMAL  Hgb A1C: STABLE FROM PREVIOUSLY, STILL WELL-CONTROLLED  Screening test for HIV: NEGATIVE    All of your lab testing looked just fine.  Your cholesterol remains very well controlled, your blood sugar is stable, and your screening test for HIV was negative.    Thank you for allowing me to participate in your care.  If you have further questions, please contact us at [phone].      Sincerely,        KATIE Maxwell MD  Dept. of Internal Medicine  [facility]

## 2018-07-13 NOTE — PROGRESS NOTES
SUBJECTIVE:   CC: Lencho Patel is an 58 year old male who presents for preventative health visit.     Physical   Annual:     Getting at least 3 servings of Calcium per day:  Yes    Bi-annual eye exam:  NO    Dental care twice a year:  Yes    Sleep apnea or symptoms of sleep apnea:  None    Diet:  Regular (no restrictions)    Frequency of exercise:  2-3 days/week    Duration of exercise:  Greater than 60 minutes    Taking medications regularly:  Yes    Medication side effects:  Other    Additional concerns today:  No            Today's PHQ-2 Score:   PHQ-2 ( 1999 [employer]) 7/13/2018   Q1: Little interest or pleasure in doing things 0   Q2: Feeling down, depressed or hopeless 0   PHQ-2 Score 0   Q1: Little interest or pleasure in doing things Not at all   Q2: Feeling down, depressed or hopeless Not at all   PHQ-2 Score 0       Abuse: Current or Past(Physical, Sexual or Emotional)- No  Do you feel safe in your environment - Yes    Social History   Substance Use Topics     Smoking status: Former Smoker     Packs/day: 0.50     Years: 15.00     Types: Cigarettes     Quit date: 2/19/2014     Smokeless tobacco: Never Used     Alcohol use 0.0 oz/week     0 Standard drinks or equivalent per week      Comment: Socially     Alcohol Use 7/13/2018   If you drink alcohol do you typically have greater than 3 drinks per day OR greater than 7 drinks per week? Yes   AUDIT SCORE  4       Last PSA:   PSA   Date Value Ref Range Status   07/10/2017 0.58 0 - 4 ug/L Final     Comment:     Assay Method:  Chemiluminescence using Siemens Vista analyzer       Reviewed orders with patient. Reviewed health maintenance and updated orders accordingly - Yes      Reviewed and updated as needed this visit by clinical staff  Tobacco  Allergies  Meds         Reviewed and updated as needed this visit by Provider          Patient continues on metformin monotherapy, and is due for follow-up surveillance labs today.  Tolerates metformin without any  difficulty, no reported hypoglycemia.    Review of Systems  CONSTITUTIONAL: NEGATIVE for fever, chills, change in weight  INTEGUMENTARY/SKIN: NEGATIVE for worrisome rashes, moles or lesions  EYES: NEGATIVE for vision changes or irritation  ENT: NEGATIVE for ear, mouth and throat problems  RESP: NEGATIVE for significant cough or SOB  CV: NEGATIVE for chest pain, palpitations or peripheral edema  GI: NEGATIVE for nausea, abdominal pain, heartburn, or change in bowel habits   male: negative for dysuria, hematuria, decreased urinary stream, erectile dysfunction, urethral discharge  MUSCULOSKELETAL: NEGATIVE for significant arthralgias or myalgia  NEURO: NEGATIVE for weakness, dizziness or paresthesias  PSYCHIATRIC: NEGATIVE for changes in mood or affect    OBJECTIVE:   /70  Pulse 78  Temp 98  F (36.7  C) (Oral)  Resp 16  Wt 148 lb 12.8 oz (67.5 kg)  SpO2 98%  BMI 23.31 kg/m2    Physical Exam  GENERAL: healthy, alert and no distress  NECK: no adenopathy, no asymmetry, masses, or scars and thyroid normal to palpation  RESP: lungs clear to auscultation - no rales, rhonchi or wheezes  CV: regular rate and rhythm, normal S1 S2, no S3 or S4, no murmur, click or rub, no peripheral edema and peripheral pulses strong  ABDOMEN: soft, nontender, no hepatosplenomegaly, no masses and bowel sounds normal  MS: no gross musculoskeletal defects noted, no edema  FOOT EXAM:  No ulcers/sores.  DP pulses 2+ bilaterally.  Vibratory sensation, and sensation to monofilament intact.       ASSESSMENT/PLAN:   1. Encounter for routine adult health examination without abnormal findings  Discussed cardiac disease risk factor modification including screening for and treating HTN, lipids, DM, and avoidance of tobacco.  Also discussed age appropriate cancer screening recommendations including testicular, prostate, colon and lung cancer as dictated by age group.  Recommended low fat, low salt diet and moderation in any alcohol intake.   "Recommended always using seatbelts when in a car.  Recommended never driving after drinking or riding with someone who has been drinking as well.     2. Type 2 diabetes mellitus without complication, without long-term current use of insulin (H)  Re-check surveillance labs today  - HEMOGLOBIN A1C  - Lipid panel reflex to direct LDL Fasting  - Albumin Random Urine Quantitative with Creat Ratio  - metFORMIN (GLUCOPHAGE) 1000 MG tablet; Take 1 tablet (1,000 mg) by mouth 2 times daily (with meals)  Dispense: 180 tablet; Refill: 3  - Comprehensive metabolic panel (BMP + Alb, Alk Phos, ALT, AST, Total. Bili, TP)    3. Screening for diabetic peripheral neuropathy    - FOOT EXAM  NO CHARGE [43944.114]    4. Screening for HIV (human immunodeficiency virus)    - HIV Screening    5. Acute bronchitis with symptoms > 10 days    - albuterol (PROAIR HFA/PROVENTIL HFA/VENTOLIN HFA) 108 (90 Base) MCG/ACT Inhaler; Inhale 1-2 puffs into the lungs every 4 hours as needed for shortness of breath / dyspnea  Dispense: 1 Inhaler; Refill: 11    COUNSELING:   Reviewed preventive health counseling, as reflected in patient instructions    BP Readings from Last 1 Encounters:   07/13/18 120/70     Estimated body mass index is 23.31 kg/(m^2) as calculated from the following:    Height as of 7/10/17: 5' 7\" (1.702 m).    Weight as of this encounter: 148 lb 12.8 oz (67.5 kg).           reports that he quit smoking about 4 years ago. His smoking use included Cigarettes. He has a 7.50 pack-year smoking history. He has never used smokeless tobacco.      Counseling Resources:  ATP IV Guidelines  Pooled Cohorts Equation Calculator  FRAX Risk Assessment  ICSI Preventive Guidelines  Dietary Guidelines for Americans, 2010  USDA's MyPlate  ASA Prophylaxis  Lung CA Screening    Jose Maxwell MD  [facility]  "

## 2018-07-14 LAB
CREAT UR-MCNC: 155 MG/DL
MICROALBUMIN UR-MCNC: 6 MG/L
MICROALBUMIN/CREAT UR: 3.95 MG/G CR (ref 0–17)

## 2018-07-17 LAB — HIV 1+2 AB+HIV1 P24 AG SERPL QL IA: NONREACTIVE

## 2018-09-30 DIAGNOSIS — E78.1 HYPERTRIGLYCERIDEMIA: ICD-10-CM

## 2018-09-30 NOTE — TELEPHONE ENCOUNTER
"Requested Prescriptions   Pending Prescriptions Disp Refills     fenofibrate 160 MG tablet [Pharmacy Med Name: FENOFIBRATE 160MG TABLETS] 90 tablet 0    Last Written Prescription Date:  7/10/2017  Last Fill Quantity: 90,  # refills: 3   Last office visit: 7/13/2018 with prescribing provider:  7/13/2018   Future Office Visit:     Sig: TAKE 1 TABLET(160 MG) BY MOUTH DAILY    Fibrates Passed    9/30/2018  8:51 AM       Passed - Lipid panel on file in past 12 months    Recent Labs   Lab Test  07/13/18   1542   09/04/15   1159   CHOL  149   < >  143   TRIG  93   < >  120   HDL  62   < >  48   LDL  68   < >  71   NHDL  87   < >   --    VLDL   --    --   24   CHOLHDLRATIO   --    --   3.0    < > = values in this interval not displayed.              Passed - No abnormal creatine kinase in past 12 months    No lab results found.            Passed - Recent (12 mo) or future (30 days) visit within the authorizing provider's specialty    Patient had office visit in the last 12 months or has a visit in the next 30 days with authorizing provider or within the authorizing provider's specialty.  See \"Patient Info\" tab in inbasket, or \"Choose Columns\" in Meds & Orders section of the refill encounter.           Passed - Patient is age 18 or older          "

## 2018-10-02 RX ORDER — FENOFIBRATE 160 MG/1
TABLET ORAL
Qty: 90 TABLET | Refills: 2 | Status: SHIPPED | OUTPATIENT
Start: 2018-10-02 | End: 2019-07-03

## 2019-04-09 ENCOUNTER — TELEPHONE (OUTPATIENT)
Dept: INTERNAL MEDICINE | Facility: CLINIC | Age: 59
End: 2019-04-09

## 2019-04-09 NOTE — TELEPHONE ENCOUNTER
Prior Authorization Retail Medication Request    Medication/Dose: albuterol (PROAIR HFA/PROVENTIL HFA/VENTOLIN HFA) 108 (90 Base) MCG/ACT Inhaler  ICD code (if different than what is on RX):  J20.9  Previously Tried and Failed:    Rationale:      Insurance Name:  [employer]  Insurance ID:  [id number]  Please call [phone]    Pharmacy Information (if different than what is on RX)  Name:  [facility]   Phone:  [phone]

## 2019-04-09 NOTE — TELEPHONE ENCOUNTER
Prior Authorization Not Needed per Insurance    Medication: albuterol (PROAIR HFA/PROVENTIL HFA/VENTOLIN HFA) 108 (90 Base) MCG/ACT Inhaler -PA NOT NEEDED 04/09/2019  Insurance Company: [employer] - Phone [phone] Fax [phone]  Expected CoPay:      Pharmacy Filling the Rx: [facility] [address] [place] [address] [address] AT [facility] & [address]  Pharmacy Notified: Yes  Patient Notified: No    I called the pharmacy to find out which Albuterol HFA was chosen as the provider did write all possible options in the order on the order. Per the pharmacy staff, they are dispensing the brand Ventolin HFA as that was covered and the provider did ok that option.    I was told to disregard the request.  The claim processed.

## 2019-07-03 DIAGNOSIS — E78.1 HYPERTRIGLYCERIDEMIA: ICD-10-CM

## 2019-07-03 RX ORDER — FENOFIBRATE 160 MG/1
TABLET ORAL
Qty: 90 TABLET | Refills: 0 | Status: SHIPPED | OUTPATIENT
Start: 2019-07-03 | End: 2019-08-19

## 2019-07-03 NOTE — TELEPHONE ENCOUNTER
"Requested Prescriptions   Pending Prescriptions Disp Refills     fenofibrate (TRIGLIDE/LOFIBRA) 160 MG tablet [Pharmacy Med Name: FENOFIBRATE 160MG TABLETS] 90 tablet 0     Sig: TAKE 1 TABLET(160 MG) BY MOUTH DAILY       Fibrates Passed - 7/3/2019 12:18 PM        Passed - Lipid panel on file in past 12 months     Recent Labs   Lab Test 07/13/18  1542  09/04/15  1159   CHOL 149   < > 143   TRIG 93   < > 120   HDL 62   < > 48   LDL 68   < > 71   NHDL 87   < >  --    VLDL  --   --  24   CHOLHDLRATIO  --   --  3.0    < > = values in this interval not displayed.               Passed - No abnormal creatine kinase in past 12 months     No lab results found.             Passed - Recent (12 mo) or future (30 days) visit within the authorizing provider's specialty     Patient had office visit in the last 12 months or has a visit in the next 30 days with authorizing provider or within the authorizing provider's specialty.  See \"Patient Info\" tab in inbasket, or \"Choose Columns\" in Meds & Orders section of the refill encounter.              Passed - Medication is active on med list        Passed - Patient is age 18 or older              Last Written Prescription Date:  10/2/2018  Last Fill Quantity: 90,  # refills: 2   Last office visit: 7/13/2018 with prescribing provider:  Jose Maxwell     Future Office Visit:      Medication is being filled for 1 time refill only due to:  Patient needs to be seen because it has been more than one year since last visit.     Prescription approved per [facility] Refill Protocol.    Karely BLACK RN, BSN, PHN      "

## 2019-08-02 DIAGNOSIS — E11.9 TYPE 2 DIABETES MELLITUS WITHOUT COMPLICATION, WITHOUT LONG-TERM CURRENT USE OF INSULIN (H): ICD-10-CM

## 2019-08-02 NOTE — TELEPHONE ENCOUNTER
Medication is being filled for 1 time refill only due to:  Patient needs to be seen because it has been more than one year since last visit.     Prescription approved per [facility] Refill Protocol.    Karely BLACK RN, BSN, PHN

## 2019-08-02 NOTE — TELEPHONE ENCOUNTER
"Requested Prescriptions   Pending Prescriptions Disp Refills     metFORMIN (GLUCOPHAGE) 1000 MG tablet [Pharmacy Med Name: METFORMIN 1000MG TABLETS] 180 tablet 0     Sig: TAKE 1 TABLET BY MOUTH TWICE DAILY WITH MEALS  Last Written Prescription Date:  07/13/18  Last Fill Quantity: 180,  # refills: 3   Last office visit: 7/13/2018 with prescribing provider:  07/13/18   Future Office Visit: 08/19/19  Next 5 appointments (look out 90 days)    Aug 19, 2019  8:45 AM CDT  PHYS[facility] with Jose Maxwell MD  [facility] [address] [id number] [phone]              Biguanide Agents Failed - 8/2/2019  9:26 AM        Failed - Blood pressure less than 140/90 in past 6 months     BP Readings from Last 3 Encounters:   07/13/18 120/70   07/10/17 128/76   07/06/16 126/78                 Failed - Patient has documented LDL within the past 12 mos.     Recent Labs   Lab Test 07/13/18  1542   LDL 68             Failed - Patient has documented A1c within the specified period of time.     If HgbA1C is 8 or greater, it needs to be on file within the past 3 months.  If less than 8, must be on file within the past 6 months.     Recent Labs   Lab Test 07/13/18  1542   A1C 6.3*             Failed - Patient's CR is NOT>1.4 OR Patient's EGFR is NOT<45 within past 12 mos.     Recent Labs   Lab Test 07/13/18  1542   GFRESTIMATED 87   GFRESTBLACK >90       Recent Labs   Lab Test 07/13/18  1542   CR 0.89             Failed - Recent (6 mo) or future (30 days) visit within the authorizing provider's specialty     Patient had office visit in the last 6 months or has a visit in the next 30 days with authorizing provider or within the authorizing provider's specialty.  See \"Patient Info\" tab in inbasket, or \"Choose Columns\" in Meds & Orders section of the refill encounter.            Passed - Patient has had a Microalbumin in the past 15 mos.     Recent Labs "   Lab Test 07/13/18  1858   MICROL 6   UMALCR 3.95             Passed - Patient is age 10 or older        Passed - Patient does NOT have a diagnosis of CHF.        Passed - Medication is active on med list

## 2019-08-06 ENCOUNTER — DOCUMENTATION ONLY (OUTPATIENT)
Dept: INTERNAL MEDICINE | Facility: CLINIC | Age: 59
End: 2019-08-06

## 2019-08-06 DIAGNOSIS — E11.9 TYPE 2 DIABETES MELLITUS WITHOUT COMPLICATION, WITHOUT LONG-TERM CURRENT USE OF INSULIN (H): Primary | ICD-10-CM

## 2019-08-19 ENCOUNTER — OFFICE VISIT (OUTPATIENT)
Dept: INTERNAL MEDICINE | Facility: CLINIC | Age: 59
End: 2019-08-19
Payer: COMMERCIAL

## 2019-08-19 VITALS
HEART RATE: 87 BPM | DIASTOLIC BLOOD PRESSURE: 70 MMHG | BODY MASS INDEX: 22.82 KG/M2 | WEIGHT: 145.4 LBS | SYSTOLIC BLOOD PRESSURE: 120 MMHG | RESPIRATION RATE: 16 BRPM | HEIGHT: 67 IN | TEMPERATURE: 98.3 F

## 2019-08-19 DIAGNOSIS — E11.9 TYPE 2 DIABETES MELLITUS WITHOUT COMPLICATION, WITHOUT LONG-TERM CURRENT USE OF INSULIN (H): ICD-10-CM

## 2019-08-19 DIAGNOSIS — J20.9 ACUTE BRONCHITIS WITH SYMPTOMS > 10 DAYS: ICD-10-CM

## 2019-08-19 DIAGNOSIS — N48.6 PEYRONIE DISEASE: ICD-10-CM

## 2019-08-19 DIAGNOSIS — E78.1 HYPERTRIGLYCERIDEMIA: ICD-10-CM

## 2019-08-19 DIAGNOSIS — Z00.00 ENCOUNTER FOR ROUTINE ADULT HEALTH EXAMINATION WITHOUT ABNORMAL FINDINGS: Primary | ICD-10-CM

## 2019-08-19 LAB
ALBUMIN SERPL-MCNC: 3.9 G/DL (ref 3.4–5)
ALP SERPL-CCNC: 50 U/L (ref 40–150)
ALT SERPL W P-5'-P-CCNC: 30 U/L (ref 0–70)
ANION GAP SERPL CALCULATED.3IONS-SCNC: 5 MMOL/L (ref 3–14)
AST SERPL W P-5'-P-CCNC: 23 U/L (ref 0–45)
BILIRUB SERPL-MCNC: 0.4 MG/DL (ref 0.2–1.3)
BUN SERPL-MCNC: 15 MG/DL (ref 7–30)
CALCIUM SERPL-MCNC: 9 MG/DL (ref 8.5–10.1)
CHLORIDE SERPL-SCNC: 103 MMOL/L (ref 94–109)
CHOLEST SERPL-MCNC: 149 MG/DL
CO2 SERPL-SCNC: 29 MMOL/L (ref 20–32)
CREAT SERPL-MCNC: 0.77 MG/DL (ref 0.66–1.25)
CREAT UR-MCNC: 32 MG/DL
GFR SERPL CREATININE-BSD FRML MDRD: >90 ML/MIN/{1.73_M2}
GLUCOSE SERPL-MCNC: 139 MG/DL (ref 70–99)
HBA1C MFR BLD: 5.9 % (ref 0–5.6)
HDLC SERPL-MCNC: 58 MG/DL
LDLC SERPL CALC-MCNC: 38 MG/DL
MICROALBUMIN UR-MCNC: 7 MG/L
MICROALBUMIN/CREAT UR: 22.91 MG/G CR (ref 0–17)
NONHDLC SERPL-MCNC: 91 MG/DL
POTASSIUM SERPL-SCNC: 3.8 MMOL/L (ref 3.4–5.3)
PROT SERPL-MCNC: 7.7 G/DL (ref 6.8–8.8)
SODIUM SERPL-SCNC: 137 MMOL/L (ref 133–144)
TRIGL SERPL-MCNC: 266 MG/DL
TSH SERPL DL<=0.005 MIU/L-ACNC: 0.53 MU/L (ref 0.4–4)

## 2019-08-19 PROCEDURE — 99396 PREV VISIT EST AGE 40-64: CPT | Performed by: INTERNAL MEDICINE

## 2019-08-19 PROCEDURE — 84443 ASSAY THYROID STIM HORMONE: CPT | Performed by: INTERNAL MEDICINE

## 2019-08-19 PROCEDURE — 80053 COMPREHEN METABOLIC PANEL: CPT | Performed by: INTERNAL MEDICINE

## 2019-08-19 PROCEDURE — 82043 UR ALBUMIN QUANTITATIVE: CPT | Performed by: INTERNAL MEDICINE

## 2019-08-19 PROCEDURE — 80061 LIPID PANEL: CPT | Performed by: INTERNAL MEDICINE

## 2019-08-19 PROCEDURE — 83036 HEMOGLOBIN GLYCOSYLATED A1C: CPT | Performed by: INTERNAL MEDICINE

## 2019-08-19 PROCEDURE — 36415 COLL VENOUS BLD VENIPUNCTURE: CPT | Performed by: INTERNAL MEDICINE

## 2019-08-19 PROCEDURE — 99213 OFFICE O/P EST LOW 20 MIN: CPT | Mod: 25 | Performed by: INTERNAL MEDICINE

## 2019-08-19 RX ORDER — FENOFIBRATE 160 MG/1
TABLET ORAL
Qty: 90 TABLET | Refills: 3 | Status: SHIPPED | OUTPATIENT
Start: 2019-08-19 | End: 2020-10-06

## 2019-08-19 RX ORDER — ALBUTEROL SULFATE 90 UG/1
1-2 AEROSOL, METERED RESPIRATORY (INHALATION) EVERY 4 HOURS PRN
Qty: 1 INHALER | Refills: 11 | Status: SHIPPED | OUTPATIENT
Start: 2019-08-19 | End: 2020-10-06

## 2019-08-19 ASSESSMENT — MIFFLIN-ST. JEOR: SCORE: 1428.16

## 2019-08-19 NOTE — PROGRESS NOTES
SUBJECTIVE:   CC: Lencho Patel is an 59 year old male who presents for preventative health visit.     Healthy Habits:     Getting at least 3 servings of Calcium per day:  Yes    Bi-annual eye exam:  NO    Dental care twice a year:  Yes    Sleep apnea or symptoms of sleep apnea:  None    Diet:  Regular (no restrictions)    Frequency of exercise:  4-5 days/week    Duration of exercise:  Greater than 60 minutes    Taking medications regularly:  Yes    Medication side effects:  None    PHQ-2 Total Score: 0    Additional concerns today:  Yes          Pt would like to discuss getting a referral for a urologist      Question 1.  In the last 12 months: We worried food would run out before we had money to buy more. Never True    Question 2.  In the last 12 months: The food we bought just didn't last and we didn't have money to buy more. Never True    Did the patient answer Sometimes True or Often True to EITHER Question 1 or Question 2? No      Today's PHQ-2 Score:   PHQ-2 (  [employer]) 2019   Q1: Little interest or pleasure in doing things 0   Q2: Feeling down, depressed or hopeless 0   PHQ-2 Score 0   Q1: Little interest or pleasure in doing things Not at all   Q2: Feeling down, depressed or hopeless Not at all   PHQ-2 Score 0       Abuse: Current or Past(Physical, Sexual or Emotional)- No  Do you feel safe in your environment? Yes    Social History     Tobacco Use     Smoking status: Former Smoker     Packs/day: 0.50     Years: 15.00     Pack years: 7.50     Types: Cigarettes     Last attempt to quit: 2014     Years since quittin.4     Smokeless tobacco: Never Used   Substance Use Topics     Alcohol use: Yes     Alcohol/week: 0.0 oz     Comment: Socially     If you drink alcohol do you typically have >3 drinks per day or >7 drinks per week? No    Alcohol Use 2019   Prescreen: >3 drinks/day or >7 drinks/week? Yes   Prescreen: >3 drinks/day or >7 drinks/week? -   AUDIT SCORE  5       Last PSA:   PSA  "  Date Value Ref Range Status   07/10/2017 0.58 0 - 4 ug/L Final     Comment:     Assay Method:  Chemiluminescence using Siemens Vista analyzer       Reviewed orders with patient. Reviewed health maintenance and updated orders accordingly - Yes      Reviewed and updated as needed this visit by clinical staff  Tobacco  Allergies  Meds  Problems  Med Hx  Surg Hx  Fam Hx         Reviewed and updated as needed this visit by Provider  Tobacco  Allergies  Meds  Problems  Med Hx  Surg Hx  Fam Hx            Patient continues on metformin monotherapy for type 2 diabetes.  Latest surveillance.  Tolerating the metformin reasonably well.    Has noticed that his penis is \"crooked\" when erect.  Would like urology referral if appropriate.    Review of Systems  CONSTITUTIONAL: NEGATIVE for fever, chills, change in weight  INTEGUMENTARY/SKIN: NEGATIVE for worrisome rashes, moles or lesions  EYES: NEGATIVE for vision changes or irritation  ENT: NEGATIVE for ear, mouth and throat problems  RESP: NEGATIVE for significant cough or SOB  CV: NEGATIVE for chest pain, palpitations or peripheral edema  GI: NEGATIVE for nausea, abdominal pain, heartburn, or change in bowel habits   male: negative for dysuria, hematuria, decreased urinary stream, erectile dysfunction, urethral discharge  MUSCULOSKELETAL: NEGATIVE for significant arthralgias or myalgia  NEURO: NEGATIVE for weakness, dizziness or paresthesias  PSYCHIATRIC: NEGATIVE for changes in mood or affect    OBJECTIVE:   /70 (BP Location: Left arm, Patient Position: Chair, Cuff Size: Adult Regular)   Pulse 87   Temp 98.3  F (36.8  C) (Oral)   Resp 16   Ht 1.702 m (5' 7\")   Wt 66 kg (145 lb 6.4 oz)   PF 98 L/min   BMI 22.77 kg/m      Physical Exam  GENERAL: healthy, alert and no distress  NECK: no adenopathy, no asymmetry, masses, or scars and thyroid normal to palpation  RESP: lungs clear to auscultation - no rales, rhonchi or wheezes  CV: regular rate and " "rhythm, normal S1 S2, no S3 or S4, no murmur, click or rub, no peripheral edema and peripheral pulses strong  ABDOMEN: soft, nontender, no hepatosplenomegaly, no masses and bowel sounds normal  MS: no gross musculoskeletal defects noted, no edema        ASSESSMENT/PLAN:   1. Encounter for routine adult health examination without abnormal findings  Discussed cardiac disease risk factor modification including screening for and treating HTN, lipids, DM, and avoidance of tobacco.  Also discussed age appropriate cancer screening recommendations including testicular, prostate, colon and lung cancer as dictated by age group.  Recommended low fat, low salt diet and moderation in any alcohol intake.  Recommended always using seatbelts when in a car.  Recommended never driving after drinking or riding with someone who has been drinking as well.     2. Peyronie disease    - UROLOGY ADULT REFERRAL    3. Type 2 diabetes mellitus without complication, without long-term current use of insulin (H)  Reasonably well controlled.  Continue metformin  - metFORMIN (GLUCOPHAGE) 1000 MG tablet; TAKE 1 TABLET BY MOUTH TWICE DAILY WITH MEALS  Dispense: 180 tablet; Refill: 3    4. Acute bronchitis with symptoms > 10 days    - albuterol (PROAIR HFA/PROVENTIL HFA/VENTOLIN HFA) 108 (90 Base) MCG/ACT inhaler; Inhale 1-2 puffs into the lungs every 4 hours as needed for shortness of breath / dyspnea  Dispense: 1 Inhaler; Refill: 11    5. Hypertriglyceridemia    - fenofibrate (TRIGLIDE/LOFIBRA) 160 MG tablet; TAKE 1 TABLET(160 MG) BY MOUTH DAILY  Dispense: 90 tablet; Refill: 3    COUNSELING:   Reviewed preventive health counseling, as reflected in patient instructions    Estimated body mass index is 22.77 kg/m  as calculated from the following:    Height as of this encounter: 1.702 m (5' 7\").    Weight as of this encounter: 66 kg (145 lb 6.4 oz).          reports that he quit smoking about 5 years ago. His smoking use included cigarettes. He has a " 7.50 pack-year smoking history. He has never used smokeless tobacco.      Counseling Resources:  ATP IV Guidelines  Pooled Cohorts Equation Calculator  FRAX Risk Assessment  ICSI Preventive Guidelines  Dietary Guidelines for Americans, 2010  USDA's MyPlate  ASA Prophylaxis  Lung CA Screening    Jose Maxwell MD  [facility]

## 2019-09-29 DIAGNOSIS — E78.1 HYPERTRIGLYCERIDEMIA: ICD-10-CM

## 2019-09-30 RX ORDER — FENOFIBRATE 160 MG/1
TABLET ORAL
Qty: 90 TABLET | Refills: 0 | OUTPATIENT
Start: 2019-09-30

## 2019-09-30 NOTE — TELEPHONE ENCOUNTER
"Requested Prescriptions   Pending Prescriptions Disp Refills     fenofibrate (TRIGLIDE/LOFIBRA) 160 MG tablet [Pharmacy Med Name: FENOFIBRATE 160MG TABLETS] 90 tablet 0     Sig: TAKE 1 TABLET(160 MG) BY MOUTH DAILY   Last Written Prescription Date:  8/19/2019  Last Fill Quantity: 90,  # refills: 3   Last Office Visit: 8/19/2019   Future Office Visit:         Fibrates Passed - 9/29/2019  1:11 PM        Passed - Lipid panel on file in past 12 months     Recent Labs   Lab Test 08/19/19  0803  09/04/15  1159   CHOL 149   < > 143   TRIG 266*   < > 120   HDL 58   < > 48   LDL 38   < > 71   NHDL 91   < >  --    VLDL  --   --  24   CHOLHDLRATIO  --   --  3.0    < > = values in this interval not displayed.               Passed - No abnormal creatine kinase in past 12 months     No lab results found.             Passed - Recent (12 mo) or future (30 days) visit within the authorizing provider's specialty     Patient has had an office visit with the authorizing provider or a provider within the authorizing providers department within the previous 12 mos or has a future within next 30 days. See \"Patient Info\" tab in inbasket, or \"Choose Columns\" in Meds & Orders section of the refill encounter.              Passed - Medication is active on med list        Passed - Patient is age 18 or older          "

## 2020-01-10 ENCOUNTER — TRANSFERRED RECORDS (OUTPATIENT)
Dept: HEALTH INFORMATION MANAGEMENT | Facility: CLINIC | Age: 60
End: 2020-01-10

## 2020-10-05 DIAGNOSIS — E78.1 HYPERTRIGLYCERIDEMIA: ICD-10-CM

## 2020-10-05 DIAGNOSIS — J20.9 ACUTE BRONCHITIS WITH SYMPTOMS > 10 DAYS: ICD-10-CM

## 2020-10-05 DIAGNOSIS — E11.9 TYPE 2 DIABETES MELLITUS WITHOUT COMPLICATION, WITHOUT LONG-TERM CURRENT USE OF INSULIN (H): ICD-10-CM

## 2020-10-05 NOTE — LETTER
[facility]  [address] [id number] [phone]            Lencho Patel  [address]  [place] [facility] [address]        October 6, 2020    Dear Lencho,    While refilling your prescription today, we noticed that you are due for an appointment with your provider, this can be done in person or virtually.  We will refill your prescription for 30 days, but a follow-up appointment must be made before any additional refills can be approved.     Taking care of your health is important to us and we look forward to seeing you in the near future.  Please call us at [phone] or [phone] (or use [employer]) to schedule an appointment.     Please disregard this notice if you have already made an appointment.    Sincerely,        [facility]

## 2020-10-06 RX ORDER — ALBUTEROL SULFATE 90 UG/1
1-2 AEROSOL, METERED RESPIRATORY (INHALATION) EVERY 4 HOURS PRN
Qty: 1 INHALER | Refills: 0 | Status: SHIPPED | OUTPATIENT
Start: 2020-10-06 | End: 2020-10-14

## 2020-10-06 RX ORDER — FENOFIBRATE 160 MG/1
TABLET ORAL
Qty: 90 TABLET | Refills: 0 | Status: SHIPPED | OUTPATIENT
Start: 2020-10-06 | End: 2020-12-02

## 2020-10-14 DIAGNOSIS — J20.9 ACUTE BRONCHITIS WITH SYMPTOMS > 10 DAYS: ICD-10-CM

## 2020-10-14 RX ORDER — ALBUTEROL SULFATE 90 UG/1
1-2 AEROSOL, METERED RESPIRATORY (INHALATION) EVERY 4 HOURS PRN
Qty: 1 INHALER | Refills: 0 | Status: SHIPPED | OUTPATIENT
Start: 2020-10-14 | End: 2020-11-18

## 2020-10-14 NOTE — LETTER
[facility]  [address] [id number] [phone]            Lencho Patel  [address]  [place] [address]        October 14, 2020    Dear Lencho,    While refilling your prescription today, we noticed that you are due for an appointment with your provider.  We will refill your prescription for 30 days, but a follow-up appointment must be made before any additional refills can be approved.     Taking care of your health is important to us and we look forward to seeing you in the near future.  Please call us at [phone] or [phone] (or use [employer]) to schedule an appointment.     Please disregard this notice if you have already made an appointment.    Sincerely,        [facility]

## 2020-11-18 DIAGNOSIS — J20.9 ACUTE BRONCHITIS WITH SYMPTOMS > 10 DAYS: ICD-10-CM

## 2020-11-18 RX ORDER — ALBUTEROL SULFATE 90 UG/1
1-2 AEROSOL, METERED RESPIRATORY (INHALATION) EVERY 4 HOURS PRN
Qty: 1 INHALER | Refills: 0 | Status: SHIPPED | OUTPATIENT
Start: 2020-11-18 | End: 2020-12-02

## 2020-12-02 ENCOUNTER — OFFICE VISIT (OUTPATIENT)
Dept: INTERNAL MEDICINE | Facility: CLINIC | Age: 60
End: 2020-12-02
Payer: COMMERCIAL

## 2020-12-02 VITALS
RESPIRATION RATE: 16 BRPM | OXYGEN SATURATION: 98 % | BODY MASS INDEX: 23.54 KG/M2 | HEIGHT: 67 IN | HEART RATE: 94 BPM | WEIGHT: 150 LBS | DIASTOLIC BLOOD PRESSURE: 70 MMHG | TEMPERATURE: 98 F | SYSTOLIC BLOOD PRESSURE: 115 MMHG

## 2020-12-02 DIAGNOSIS — Z00.00 ENCOUNTER FOR ROUTINE ADULT HEALTH EXAMINATION WITHOUT ABNORMAL FINDINGS: Primary | ICD-10-CM

## 2020-12-02 DIAGNOSIS — Z13.220 SCREENING FOR HYPERLIPIDEMIA: ICD-10-CM

## 2020-12-02 DIAGNOSIS — Z23 ENCOUNTER FOR IMMUNIZATION: ICD-10-CM

## 2020-12-02 DIAGNOSIS — J20.9 ACUTE BRONCHITIS WITH SYMPTOMS > 10 DAYS: ICD-10-CM

## 2020-12-02 DIAGNOSIS — E78.1 HYPERTRIGLYCERIDEMIA: ICD-10-CM

## 2020-12-02 DIAGNOSIS — Z12.5 SCREENING FOR PROSTATE CANCER: ICD-10-CM

## 2020-12-02 DIAGNOSIS — E11.9 TYPE 2 DIABETES MELLITUS WITHOUT COMPLICATION, WITHOUT LONG-TERM CURRENT USE OF INSULIN (H): ICD-10-CM

## 2020-12-02 LAB
ANION GAP SERPL CALCULATED.3IONS-SCNC: 5 MMOL/L (ref 3–14)
BUN SERPL-MCNC: 14 MG/DL (ref 7–30)
CALCIUM SERPL-MCNC: 9.4 MG/DL (ref 8.5–10.1)
CHLORIDE SERPL-SCNC: 108 MMOL/L (ref 94–109)
CHOLEST SERPL-MCNC: 168 MG/DL
CO2 SERPL-SCNC: 26 MMOL/L (ref 20–32)
CREAT SERPL-MCNC: 0.9 MG/DL (ref 0.66–1.25)
CREAT UR-MCNC: 112 MG/DL
GFR SERPL CREATININE-BSD FRML MDRD: >90 ML/MIN/{1.73_M2}
GLUCOSE SERPL-MCNC: 130 MG/DL (ref 70–99)
HBA1C MFR BLD: 6.3 % (ref 0–5.6)
HDLC SERPL-MCNC: 52 MG/DL
LDLC SERPL CALC-MCNC: 95 MG/DL
MICROALBUMIN UR-MCNC: 8 MG/L
MICROALBUMIN/CREAT UR: 7.15 MG/G CR (ref 0–17)
NONHDLC SERPL-MCNC: 116 MG/DL
POTASSIUM SERPL-SCNC: 4.4 MMOL/L (ref 3.4–5.3)
PSA SERPL-ACNC: 0.59 UG/L (ref 0–4)
SODIUM SERPL-SCNC: 139 MMOL/L (ref 133–144)
TRIGL SERPL-MCNC: 105 MG/DL
TSH SERPL DL<=0.005 MIU/L-ACNC: 0.86 MU/L (ref 0.4–4)

## 2020-12-02 PROCEDURE — 83036 HEMOGLOBIN GLYCOSYLATED A1C: CPT | Performed by: INTERNAL MEDICINE

## 2020-12-02 PROCEDURE — 99396 PREV VISIT EST AGE 40-64: CPT | Mod: 25 | Performed by: INTERNAL MEDICINE

## 2020-12-02 PROCEDURE — 82043 UR ALBUMIN QUANTITATIVE: CPT | Performed by: INTERNAL MEDICINE

## 2020-12-02 PROCEDURE — 84443 ASSAY THYROID STIM HORMONE: CPT | Performed by: INTERNAL MEDICINE

## 2020-12-02 PROCEDURE — 80061 LIPID PANEL: CPT | Performed by: INTERNAL MEDICINE

## 2020-12-02 PROCEDURE — 36415 COLL VENOUS BLD VENIPUNCTURE: CPT | Performed by: INTERNAL MEDICINE

## 2020-12-02 PROCEDURE — 90471 IMMUNIZATION ADMIN: CPT | Performed by: INTERNAL MEDICINE

## 2020-12-02 PROCEDURE — G0103 PSA SCREENING: HCPCS | Performed by: INTERNAL MEDICINE

## 2020-12-02 PROCEDURE — 80048 BASIC METABOLIC PNL TOTAL CA: CPT | Performed by: INTERNAL MEDICINE

## 2020-12-02 PROCEDURE — 90682 RIV4 VACC RECOMBINANT DNA IM: CPT | Performed by: INTERNAL MEDICINE

## 2020-12-02 RX ORDER — FENOFIBRATE 160 MG/1
TABLET ORAL
Qty: 90 TABLET | Refills: 3 | Status: SHIPPED | OUTPATIENT
Start: 2020-12-02 | End: 2021-11-30

## 2020-12-02 RX ORDER — ALBUTEROL SULFATE 90 UG/1
1-2 AEROSOL, METERED RESPIRATORY (INHALATION) EVERY 4 HOURS PRN
Qty: 1 INHALER | Refills: 11 | Status: SHIPPED | OUTPATIENT
Start: 2020-12-02 | End: 2022-01-03

## 2020-12-02 ASSESSMENT — MIFFLIN-ST. JEOR: SCORE: 1444.03

## 2020-12-02 NOTE — LETTER
12/20/2020         Lencho Patel  [address]  [place] [address]            Dear Mr. Patel,    I am writing to inform you of the lab tests you had performed recently.      Your cholesterol results are as follows:    Lab Results   Component Value Date    HDL 52 12/02/2020    LDL 95 12/02/2020    CHOL 168 12/02/2020    TRIG 105 12/02/2020       Additional lab results are as follows:    {KTW LAB RESULT:[id number]}    ***    Thank you for allowing me to participate in your care.  If you have further questions, please contact us at [phone].      Sincerely,        KATIE Maxwell MD  Dept. of Internal Medicine  [facility]

## 2020-12-02 NOTE — LETTER
12/21/2020         Lencho Patel  [address]  [facility] [address]            Dear Mr. Patel,    I am writing to inform you of the lab tests you had performed recently.      Your lab results are as follows:    Liver function: NORMAL  Kidney function: NORMAL  PSA: NORMAL  Electrolytes: NORMAL  Glucose: NORMAL  Hgb A1C: STABLE FROM PREVIOUSLY    Your diabetes remains well-controlled.  Please continue current medications.    Thank you for allowing me to participate in your care.  If you have further questions, please contact us at [phone].      Sincerely,        KATIE Maxwell MD  Dept. of Internal Medicine  [facility]

## 2021-04-23 ENCOUNTER — IMMUNIZATION (OUTPATIENT)
Dept: NURSING | Facility: CLINIC | Age: 61
End: 2021-04-23
Payer: COMMERCIAL

## 2021-04-23 PROCEDURE — 0001A PR COVID VAC PFIZER DIL RECON 30 MCG/0.3 ML IM: CPT

## 2021-04-23 PROCEDURE — 91300 PR COVID VAC PFIZER DIL RECON 30 MCG/0.3 ML IM: CPT

## 2021-05-14 ENCOUNTER — IMMUNIZATION (OUTPATIENT)
Dept: NURSING | Facility: CLINIC | Age: 61
End: 2021-05-14
Attending: INTERNAL MEDICINE
Payer: COMMERCIAL

## 2021-05-14 PROCEDURE — 91300 PR COVID VAC PFIZER DIL RECON 30 MCG/0.3 ML IM: CPT

## 2021-05-14 PROCEDURE — 0002A PR COVID VAC PFIZER DIL RECON 30 MCG/0.3 ML IM: CPT

## 2021-06-05 ENCOUNTER — HEALTH MAINTENANCE LETTER (OUTPATIENT)
Age: 61
End: 2021-06-05

## 2021-09-25 ENCOUNTER — HEALTH MAINTENANCE LETTER (OUTPATIENT)
Age: 61
End: 2021-09-25

## 2021-11-30 DIAGNOSIS — E78.1 HYPERTRIGLYCERIDEMIA: ICD-10-CM

## 2021-11-30 DIAGNOSIS — E11.9 TYPE 2 DIABETES MELLITUS WITHOUT COMPLICATION, WITHOUT LONG-TERM CURRENT USE OF INSULIN (H): ICD-10-CM

## 2021-11-30 RX ORDER — FENOFIBRATE 160 MG/1
TABLET ORAL
Qty: 90 TABLET | Refills: 0 | Status: SHIPPED | OUTPATIENT
Start: 2021-11-30 | End: 2022-04-03

## 2021-11-30 NOTE — TELEPHONE ENCOUNTER
Routing refill request to provider for review/approval because:  Labs not current:    Hemoglobin A1C   Date Value Ref Range Status   12/02/2020 6.3 (H) 0 - 5.6 % Final     Comment:     Normal <5.7% Prediabetes 5.7-6.4%  Diabetes 6.5% or higher - adopted from ADA   consensus guidelines.         Patient needs to be seen because it has been more than 6 months since last office visit.  Zuly Denton RN

## 2021-12-24 ENCOUNTER — DOCUMENTATION ONLY (OUTPATIENT)
Dept: LAB | Facility: CLINIC | Age: 61
End: 2021-12-24
Payer: COMMERCIAL

## 2021-12-24 DIAGNOSIS — E11.9 TYPE 2 DIABETES MELLITUS WITHOUT COMPLICATION, WITHOUT LONG-TERM CURRENT USE OF INSULIN (H): Primary | ICD-10-CM

## 2021-12-24 DIAGNOSIS — Z12.5 SCREENING FOR PROSTATE CANCER: ICD-10-CM

## 2021-12-24 NOTE — PROGRESS NOTES
PATIENT COMING IN FOR LABS 01/06/2022. PLEASE PLACE ORDERS.     IF PATIENT DOES NOT NEED LABS, PLEASE HAVE MA CONTACT PATIENT AND CANCEL APPT.    THANK YOU.

## 2021-12-31 DIAGNOSIS — J20.9 ACUTE BRONCHITIS WITH SYMPTOMS > 10 DAYS: ICD-10-CM

## 2022-01-03 RX ORDER — ALBUTEROL SULFATE 90 UG/1
AEROSOL, METERED RESPIRATORY (INHALATION)
Qty: 6.7 G | Refills: 0 | Status: SHIPPED | OUTPATIENT
Start: 2022-01-03 | End: 2022-01-18

## 2022-01-06 ENCOUNTER — LAB (OUTPATIENT)
Dept: LAB | Facility: CLINIC | Age: 62
End: 2022-01-06

## 2022-01-06 DIAGNOSIS — E11.9 TYPE 2 DIABETES MELLITUS WITHOUT COMPLICATION, WITHOUT LONG-TERM CURRENT USE OF INSULIN (H): ICD-10-CM

## 2022-01-06 DIAGNOSIS — Z12.5 SCREENING FOR PROSTATE CANCER: ICD-10-CM

## 2022-01-06 LAB
ALBUMIN SERPL-MCNC: 4 G/DL (ref 3.4–5)
ALP SERPL-CCNC: 51 U/L (ref 40–150)
ALT SERPL W P-5'-P-CCNC: 31 U/L (ref 0–70)
ANION GAP SERPL CALCULATED.3IONS-SCNC: 5 MMOL/L (ref 3–14)
AST SERPL W P-5'-P-CCNC: 17 U/L (ref 0–45)
BILIRUB SERPL-MCNC: 0.5 MG/DL (ref 0.2–1.3)
BUN SERPL-MCNC: 10 MG/DL (ref 7–30)
CALCIUM SERPL-MCNC: 9.1 MG/DL (ref 8.5–10.1)
CHLORIDE BLD-SCNC: 104 MMOL/L (ref 94–109)
CHOLEST SERPL-MCNC: 162 MG/DL
CO2 SERPL-SCNC: 28 MMOL/L (ref 20–32)
CREAT SERPL-MCNC: 0.88 MG/DL (ref 0.66–1.25)
FASTING STATUS PATIENT QL REPORTED: YES
GFR SERPL CREATININE-BSD FRML MDRD: >90 ML/MIN/1.73M2
GLUCOSE BLD-MCNC: 115 MG/DL (ref 70–99)
HBA1C MFR BLD: 6.4 % (ref 0–5.6)
HDLC SERPL-MCNC: 56 MG/DL
LDLC SERPL CALC-MCNC: 68 MG/DL
NONHDLC SERPL-MCNC: 106 MG/DL
POTASSIUM BLD-SCNC: 3.9 MMOL/L (ref 3.4–5.3)
PROT SERPL-MCNC: 7.8 G/DL (ref 6.8–8.8)
PSA SERPL-MCNC: 0.47 UG/L (ref 0–4)
SODIUM SERPL-SCNC: 137 MMOL/L (ref 133–144)
TRIGL SERPL-MCNC: 188 MG/DL

## 2022-01-06 PROCEDURE — 36415 COLL VENOUS BLD VENIPUNCTURE: CPT

## 2022-01-06 PROCEDURE — 80061 LIPID PANEL: CPT

## 2022-01-06 PROCEDURE — 82043 UR ALBUMIN QUANTITATIVE: CPT

## 2022-01-06 PROCEDURE — 83036 HEMOGLOBIN GLYCOSYLATED A1C: CPT

## 2022-01-06 PROCEDURE — 80053 COMPREHEN METABOLIC PANEL: CPT

## 2022-01-06 PROCEDURE — G0103 PSA SCREENING: HCPCS

## 2022-01-11 LAB
CREAT UR-MCNC: 82 MG/DL
MICROALBUMIN UR-MCNC: <5 MG/L
MICROALBUMIN/CREAT UR: NORMAL MG/G{CREAT}

## 2022-01-15 ENCOUNTER — HEALTH MAINTENANCE LETTER (OUTPATIENT)
Age: 62
End: 2022-01-15

## 2022-01-16 DIAGNOSIS — J20.9 ACUTE BRONCHITIS WITH SYMPTOMS > 10 DAYS: ICD-10-CM

## 2022-01-17 NOTE — TELEPHONE ENCOUNTER
Routing refill request to provider for review/approval because:  Mima given x1 and patient did not follow up, please advise.    [facility] message sent to patient 1/17/22.    Mona Escamilla RN

## 2022-01-18 RX ORDER — ALBUTEROL SULFATE 90 UG/1
AEROSOL, METERED RESPIRATORY (INHALATION)
Qty: 6.7 G | Refills: 0 | Status: SHIPPED | OUTPATIENT
Start: 2022-01-18 | End: 2022-02-07

## 2022-02-03 DIAGNOSIS — J20.9 ACUTE BRONCHITIS WITH SYMPTOMS > 10 DAYS: ICD-10-CM

## 2022-02-03 NOTE — TELEPHONE ENCOUNTER
Routing refill request to provider for review/approval because:  Patient needs to be seen because it has been more than 1 year since last office visit.    Mima refill already given and Mychart sent last month, but pt has not read message.    Sophia Stehpen RN

## 2022-02-07 RX ORDER — ALBUTEROL SULFATE 90 UG/1
AEROSOL, METERED RESPIRATORY (INHALATION)
Qty: 6.7 G | Refills: 0 | Status: SHIPPED | OUTPATIENT
Start: 2022-02-07 | End: 2022-02-24

## 2022-02-23 DIAGNOSIS — J20.9 ACUTE BRONCHITIS WITH SYMPTOMS > 10 DAYS: ICD-10-CM

## 2022-02-23 NOTE — TELEPHONE ENCOUNTER
Called patient to schedule appointment to be seen, no answer, left message to call clinic back.     Routing refill request to provider for review/approval because:  Mima given x1 and patient did not follow up, please advise  Patient needs to be seen because it has been more than 1 year since last office visit.  LOV 12/2020    Routing to TC - Please reach out to patient to schedule annual wellness exam  Monica Drew RN

## 2022-02-24 RX ORDER — ALBUTEROL SULFATE 90 UG/1
AEROSOL, METERED RESPIRATORY (INHALATION)
Qty: 6.7 G | Refills: 0 | Status: SHIPPED | OUTPATIENT
Start: 2022-02-24 | End: 2022-03-16

## 2022-03-15 DIAGNOSIS — J20.9 ACUTE BRONCHITIS WITH SYMPTOMS > 10 DAYS: ICD-10-CM

## 2022-03-16 RX ORDER — ALBUTEROL SULFATE 90 UG/1
AEROSOL, METERED RESPIRATORY (INHALATION)
Qty: 6.7 G | Refills: 0 | Status: SHIPPED | OUTPATIENT
Start: 2022-03-16 | End: 2022-04-03

## 2022-03-16 NOTE — TELEPHONE ENCOUNTER
Routing refill request to provider for review/approval because:  Please review. Future visit: 4/11/22        Samantha Morales RN  [facility]

## 2022-03-31 DIAGNOSIS — J20.9 ACUTE BRONCHITIS WITH SYMPTOMS > 10 DAYS: ICD-10-CM

## 2022-03-31 DIAGNOSIS — E78.1 HYPERTRIGLYCERIDEMIA: ICD-10-CM

## 2022-03-31 NOTE — TELEPHONE ENCOUNTER
Routing refill request to provider for review/approval because:  Failed protocol due to:  over 1 year since last visit,    Patient scheduled 5/12    Chris Caldwell RN  [facility] Triage Nurse

## 2022-04-03 RX ORDER — ALBUTEROL SULFATE 90 UG/1
AEROSOL, METERED RESPIRATORY (INHALATION)
Qty: 6.7 G | Refills: 0 | Status: SHIPPED | OUTPATIENT
Start: 2022-04-03 | End: 2022-04-19

## 2022-04-03 RX ORDER — FENOFIBRATE 160 MG/1
TABLET ORAL
Qty: 90 TABLET | Refills: 0 | Status: SHIPPED | OUTPATIENT
Start: 2022-04-03 | End: 2022-05-12

## 2022-04-17 DIAGNOSIS — J20.9 ACUTE BRONCHITIS WITH SYMPTOMS > 10 DAYS: ICD-10-CM

## 2022-04-19 RX ORDER — ALBUTEROL SULFATE 90 UG/1
AEROSOL, METERED RESPIRATORY (INHALATION)
Qty: 6.7 G | Refills: 0 | Status: SHIPPED | OUTPATIENT
Start: 2022-04-19 | End: 2022-05-09

## 2022-05-09 DIAGNOSIS — J20.9 ACUTE BRONCHITIS WITH SYMPTOMS > 10 DAYS: ICD-10-CM

## 2022-05-09 RX ORDER — ALBUTEROL SULFATE 90 UG/1
AEROSOL, METERED RESPIRATORY (INHALATION)
Qty: 6.7 G | Refills: 0 | Status: SHIPPED | OUTPATIENT
Start: 2022-05-09 | End: 2022-05-12

## 2022-05-12 ENCOUNTER — OFFICE VISIT (OUTPATIENT)
Dept: INTERNAL MEDICINE | Facility: CLINIC | Age: 62
End: 2022-05-12
Payer: COMMERCIAL

## 2022-05-12 VITALS
DIASTOLIC BLOOD PRESSURE: 78 MMHG | TEMPERATURE: 97.9 F | HEIGHT: 68 IN | SYSTOLIC BLOOD PRESSURE: 136 MMHG | BODY MASS INDEX: 23.64 KG/M2 | WEIGHT: 156 LBS | HEART RATE: 83 BPM | OXYGEN SATURATION: 98 %

## 2022-05-12 DIAGNOSIS — Z00.00 ENCOUNTER FOR ROUTINE ADULT HEALTH EXAMINATION WITHOUT ABNORMAL FINDINGS: Primary | ICD-10-CM

## 2022-05-12 DIAGNOSIS — J20.9 ACUTE BRONCHITIS WITH SYMPTOMS > 10 DAYS: ICD-10-CM

## 2022-05-12 DIAGNOSIS — E78.1 HYPERTRIGLYCERIDEMIA: ICD-10-CM

## 2022-05-12 DIAGNOSIS — Z12.11 SCREEN FOR COLON CANCER: ICD-10-CM

## 2022-05-12 DIAGNOSIS — E11.9 TYPE 2 DIABETES MELLITUS WITHOUT COMPLICATION, WITHOUT LONG-TERM CURRENT USE OF INSULIN (H): ICD-10-CM

## 2022-05-12 PROCEDURE — 99396 PREV VISIT EST AGE 40-64: CPT | Performed by: INTERNAL MEDICINE

## 2022-05-12 RX ORDER — FENOFIBRATE 160 MG/1
TABLET ORAL
Qty: 90 TABLET | Refills: 3 | Status: SHIPPED | OUTPATIENT
Start: 2022-05-12 | End: 2023-06-13

## 2022-05-12 RX ORDER — ALBUTEROL SULFATE 90 UG/1
AEROSOL, METERED RESPIRATORY (INHALATION)
Qty: 6.7 G | Refills: 11 | Status: SHIPPED | OUTPATIENT
Start: 2022-05-12

## 2022-05-12 ASSESSMENT — ENCOUNTER SYMPTOMS
COUGH: 0
HEMATURIA: 0
JOINT SWELLING: 0
PARESTHESIAS: 0
HEMATOCHEZIA: 0
WEAKNESS: 0
MYALGIAS: 1
FREQUENCY: 0
NERVOUS/ANXIOUS: 0
ARTHRALGIAS: 1
DYSURIA: 0
SORE THROAT: 0
CONSTIPATION: 0
CHILLS: 0
FEVER: 0
NAUSEA: 0
PALPITATIONS: 0
HEARTBURN: 0
DIZZINESS: 0
SHORTNESS OF BREATH: 1
ABDOMINAL PAIN: 0
DIARRHEA: 0
HEADACHES: 0
EYE PAIN: 0

## 2022-05-12 NOTE — PROGRESS NOTES
SUBJECTIVE:   CC: Lencho Patel is an 62 year old male who presents for preventative health visit.   Patient has been advised of split billing requirements and indicates understanding: Yes  Healthy Habits:     Getting at least 3 servings of Calcium per day:  Yes    Bi-annual eye exam:  Yes    Dental care twice a year:  Yes    Sleep apnea or symptoms of sleep apnea:  None    Diet:  Diabetic    Frequency of exercise:  2-3 days/week    Duration of exercise:  Greater than 60 minutes    Taking medications regularly:  Yes    Medication side effects:  None    PHQ-2 Total Score: 0    Additional concerns today:  No      Today's PHQ-2 Score:   PHQ-2 (  [employer]) 2022   Q1: Little interest or pleasure in doing things 0   Q2: Feeling down, depressed or hopeless 0   PHQ-2 Score 0   PHQ-2 Total Score (12-17 Years)- Positive if 3 or more points; Administer PHQ-A if positive -   Q1: Little interest or pleasure in doing things Not at all   Q2: Feeling down, depressed or hopeless Not at all   PHQ-2 Score 0       Abuse: Current or Past(Physical, Sexual or Emotional)- No  Do you feel safe in your environment? Yes        Social History     Tobacco Use     Smoking status: Former Smoker     Packs/day: 0.50     Years: 15.00     Pack years: 7.50     Types: Cigarettes     Quit date: 2014     Years since quittin.2     Smokeless tobacco: Never Used   Substance Use Topics     Alcohol use: Yes     Alcohol/week: 0.0 standard drinks     Comment: Socially     If you drink alcohol do you typically have >3 drinks per day or >7 drinks per week? Yes      Alcohol Use 2022   Prescreen: >3 drinks/day or >7 drinks/week? Yes   Prescreen: >3 drinks/day or >7 drinks/week? -   AUDIT SCORE  4     AUDIT - Alcohol Use Disorders Identification Test - Reproduced from the World Health Organization Audit 2001 (Second Edition) 2022   1.  How often do you have a drink containing alcohol? 2 to 3 times a week   2.  How many drinks containing  alcohol do you have on a typical day when you are drinking? 3 or 4   3.  How often do you have five or more drinks on one occasion? Never   4.  How often during the last year have you found that you were not able to stop drinking once you had started? Never   5.  How often during the last year have you failed to do what was normally expected of you because of drinking? Never   6.  How often during the last year have you needed a first drink in the morning to get yourself going after a heavy drinking session? Never   7.  How often during the last year have you had a feeling of guilt or remorse after drinking? Never   8.  How often during the last year have you been unable to remember what happened the night before because of your drinking? Never   9.  Have you or someone else been injured because of your drinking? No   10. Has a relative, friend, doctor or other health care worker been concerned about your drinking or suggested you cut down? No   TOTAL SCORE 4       Last PSA:   PSA   Date Value Ref Range Status   12/02/2020 0.59 0 - 4 ug/L Final     Comment:     Assay Method:  Chemiluminescence using Siemens Vista analyzer     Prostate Specific Antigen Screen   Date Value Ref Range Status   01/06/2022 0.47 0.00 - 4.00 ug/L Final       Reviewed orders with patient. Reviewed health maintenance and updated orders accordingly - Yes      Reviewed and updated as needed this visit by clinical staff   Tobacco  Allergies  Meds   Med Hx  Surg Hx  Fam Hx  Soc Hx          Reviewed and updated as needed this visit by Provider                       Continues on metformin monotherapy for type 2 diabetes      Review of Systems   Constitutional: Negative for chills and fever.   HENT: Positive for congestion. Negative for ear pain, hearing loss and sore throat.    Eyes: Negative for pain and visual disturbance.   Respiratory: Positive for shortness of breath. Negative for cough.    Cardiovascular: Negative for chest pain,  "palpitations and peripheral edema.   Gastrointestinal: Negative for abdominal pain, constipation, diarrhea, heartburn, hematochezia and nausea.   Genitourinary: Negative for dysuria, frequency, genital sores, hematuria, impotence, penile discharge and urgency.   Musculoskeletal: Positive for arthralgias and myalgias. Negative for joint swelling.   Skin: Negative for rash.   Neurological: Negative for dizziness, weakness, headaches and paresthesias.   Psychiatric/Behavioral: Negative for mood changes. The patient is not nervous/anxious.          OBJECTIVE:   /78   Pulse 83   Temp 97.9  F (36.6  C)   Ht 1.727 m (5' 8\")   Wt 70.8 kg (156 lb)   SpO2 98%   BMI 23.72 kg/m      Physical Exam  GENERAL: healthy, alert and no distress  NECK: no adenopathy, no asymmetry, masses, or scars and thyroid normal to palpation  RESP: lungs clear to auscultation - no rales, rhonchi or wheezes  CV: regular rate and rhythm, normal S1 S2, no S3 or S4, no murmur, click or rub, no peripheral edema and peripheral pulses strong  ABDOMEN: soft, nontender, no hepatosplenomegaly, no masses and bowel sounds normal  MS: no gross musculoskeletal defects noted, no edema        ASSESSMENT/PLAN:   Encounter for routine adult health examination without abnormal findings  Discussed cardiac disease risk factor modification including screening for and treating HTN, lipids, DM, and avoidance of tobacco.  Also discussed age appropriate cancer screening recommendations including testicular, prostate, colon and lung cancer as dictated by age group.  Recommended low fat, low salt diet and moderation in any alcohol intake.  Recommended always using seatbelts when in a car.  Recommended never driving after drinking or riding with someone who has been drinking as well.     Type 2 diabetes mellitus without complication, without long-term current use of insulin (H)  Stable, reasonably well-controlled    Screen for colon cancer  Agrees to screening - " "personal history of polyps  - Adult Gastro Ref - Procedure Only; Future    Hypertriglyceridemia    - fenofibrate (TRIGLIDE/LOFIBRA) 160 MG tablet; TAKE 1 TABLET(160 MG) BY MOUTH DAILY    Acute bronchitis with symptoms > 10 days    - albuterol (PROAIR HFA/PROVENTIL HFA/VENTOLIN HFA) 108 (90 Base) MCG/ACT inhaler; INHALE 1 TO 2 PUFFS INTO THE LUNGS EVERY 4 HOURS AS NEEDED FOR SHORTNESS OF BREATH OR DIFFICULT BREATHING      Patient has been advised of split billing requirements and indicates understanding: Yes    COUNSELING:   Reviewed preventive health counseling, as reflected in patient instructions    Estimated body mass index is 23.72 kg/m  as calculated from the following:    Height as of this encounter: 1.727 m (5' 8\").    Weight as of this encounter: 70.8 kg (156 lb).         He reports that he quit smoking about 8 years ago. His smoking use included cigarettes. He has a 7.50 pack-year smoking history. He has never used smokeless tobacco.      Counseling Resources:  ATP IV Guidelines  Pooled Cohorts Equation Calculator  FRAX Risk Assessment  ICSI Preventive Guidelines  Dietary Guidelines for Americans, 2010  [employer]'s MyPlate  ASA Prophylaxis  Lung CA Screening    Jose Maxwell MD  [facility]  "

## 2022-08-27 ENCOUNTER — HEALTH MAINTENANCE LETTER (OUTPATIENT)
Age: 62
End: 2022-08-27

## 2022-12-15 DIAGNOSIS — E11.9 TYPE 2 DIABETES MELLITUS WITHOUT COMPLICATION, WITHOUT LONG-TERM CURRENT USE OF INSULIN (H): ICD-10-CM

## 2023-04-16 ENCOUNTER — OFFICE VISIT (OUTPATIENT)
Dept: URGENT CARE | Facility: URGENT CARE | Age: 63
End: 2023-04-16
Payer: COMMERCIAL

## 2023-04-16 VITALS
TEMPERATURE: 99.1 F | BODY MASS INDEX: 23.72 KG/M2 | HEART RATE: 92 BPM | DIASTOLIC BLOOD PRESSURE: 85 MMHG | WEIGHT: 156 LBS | OXYGEN SATURATION: 96 % | SYSTOLIC BLOOD PRESSURE: 163 MMHG | RESPIRATION RATE: 20 BRPM

## 2023-04-16 DIAGNOSIS — R05.9 COUGH, UNSPECIFIED TYPE: ICD-10-CM

## 2023-04-16 DIAGNOSIS — R07.0 THROAT PAIN: ICD-10-CM

## 2023-04-16 DIAGNOSIS — J20.9 ACUTE BRONCHITIS, UNSPECIFIED ORGANISM: Primary | ICD-10-CM

## 2023-04-16 LAB
DEPRECATED S PYO AG THROAT QL EIA: NEGATIVE
FLUAV AG SPEC QL IA: NEGATIVE
FLUBV AG SPEC QL IA: NEGATIVE
GROUP A STREP BY PCR: NOT DETECTED
SARS-COV-2 RNA RESP QL NAA+PROBE: NEGATIVE

## 2023-04-16 PROCEDURE — 87804 INFLUENZA ASSAY W/OPTIC: CPT

## 2023-04-16 PROCEDURE — U0003 INFECTIOUS AGENT DETECTION BY NUCLEIC ACID (DNA OR RNA); SEVERE ACUTE RESPIRATORY SYNDROME CORONAVIRUS 2 (SARS-COV-2) (CORONAVIRUS DISEASE [COVID-19]), AMPLIFIED PROBE TECHNIQUE, MAKING USE OF HIGH THROUGHPUT TECHNOLOGIES AS DESCRIBED BY CMS-2020-01-R: HCPCS | Performed by: INTERNAL MEDICINE

## 2023-04-16 PROCEDURE — U0005 INFEC AGEN DETEC AMPLI PROBE: HCPCS | Performed by: INTERNAL MEDICINE

## 2023-04-16 PROCEDURE — 87651 STREP A DNA AMP PROBE: CPT | Performed by: INTERNAL MEDICINE

## 2023-04-16 PROCEDURE — 99213 OFFICE O/P EST LOW 20 MIN: CPT | Mod: CS | Performed by: INTERNAL MEDICINE

## 2023-04-16 RX ORDER — ALBUTEROL SULFATE 90 UG/1
2 AEROSOL, METERED RESPIRATORY (INHALATION) EVERY 6 HOURS PRN
Qty: 18 G | Refills: 3 | Status: SHIPPED | OUTPATIENT
Start: 2023-04-16

## 2023-04-16 RX ORDER — PREDNISONE 20 MG/1
40 TABLET ORAL DAILY
Qty: 10 TABLET | Refills: 0 | Status: SHIPPED | OUTPATIENT
Start: 2023-04-16 | End: 2023-04-21

## 2023-04-16 NOTE — PROGRESS NOTES
Assessment & Plan     Acute bronchitis, unspecified organism  - albuterol (PROAIR HFA/PROVENTIL HFA/VENTOLIN HFA) 108 (90 Base) MCG/ACT inhaler; Inhale 2 puffs into the lungs every 6 hours as needed for shortness of breath, wheezing or cough  - predniSONE (DELTASONE) 20 MG tablet; Take 2 tablets (40 mg) by mouth daily for 5 days    Throat pain  - Streptococcus A Rapid Screen w/Reflex to PCR - Clinic Collect  - Group A Streptococcus PCR Throat Swab    Cough, unspecified type  - Influenza A & B Antigen - Clinic Collect  - Symptomatic COVID-19 Virus (Coronavirus) by PCR Nose    Steven Fish MD  [facility]    Eunice Musa is a 63 year old, presenting for the following health issues:  Cough (With some SOB, all sx's started 4/14/2023) and Pharyngitis         View : No data to display.              HPI   Concern with chest congestion and shortness of breath.  He is noting this increasing over the last three days.  Denies fevers.  The throat is sore and noting cough and throat-clearing.  Noting some pressure in the sinuses.  All of this has been fluctuating.  He has a prior history of allergy and reactive airways.     CMEDS: albuterol MDI, non-drowsy antihistamine    SOCH: he does smoke    Review of Systems   Constitutional, HEENT, cardiovascular, pulmonary, gi and gu systems are negative, except as otherwise noted.      Objective    BP (!) 163/85   Pulse 92   Temp 99.1  F (37.3  C)   Resp 20   Wt 70.8 kg (156 lb)   SpO2 96%   BMI 23.72 kg/m    Body mass index is 23.72 kg/m .  Physical Exam   GENERAL APPEARANCE: healthy, alert and no distress  HENT: ear canals and TM's normal, nose and mouth without ulcers or lesions and papillary hypertrophy of the tongue  NECK: no adenopathy and no asymmetry, masses, or scars  RESP: diffuse, intermittent wheeze without focal rales or rhonchi; no dullness to percussion and good air movement throughout  CV: regular rates and rhythm, normal S1  S2, no S3 or S4 and no murmur, click or rub    Results for orders placed or performed in visit on 04/16/23 (from the past 24 hour(s))   Streptococcus A Rapid Screen w/Reflex to PCR - Clinic Collect    Specimen: Throat; Swab   Result Value Ref Range    Group A Strep antigen Negative Negative   Influenza A & B Antigen - Clinic Collect    Specimen: Nasopharyngeal; Swab   Result Value Ref Range    Influenza A antigen Negative Negative    Influenza B antigen Negative Negative    Narrative    Test results must be correlated with clinical data. If necessary, results should be confirmed by a molecular assay or viral culture.

## 2023-04-16 NOTE — PATIENT INSTRUCTIONS
There is a good chance that the albuterol alone will do the trick with this.  However, if things are getting worse, then prednisone could be helpful.

## 2023-04-20 ENCOUNTER — PATIENT OUTREACH (OUTPATIENT)
Dept: CARE COORDINATION | Facility: CLINIC | Age: 63
End: 2023-04-20
Payer: COMMERCIAL

## 2023-04-22 ENCOUNTER — HEALTH MAINTENANCE LETTER (OUTPATIENT)
Age: 63
End: 2023-04-22

## 2023-05-04 ENCOUNTER — PATIENT OUTREACH (OUTPATIENT)
Dept: CARE COORDINATION | Facility: CLINIC | Age: 63
End: 2023-05-04
Payer: COMMERCIAL

## 2023-06-13 DIAGNOSIS — E78.1 HYPERTRIGLYCERIDEMIA: ICD-10-CM

## 2023-06-13 RX ORDER — FENOFIBRATE 160 MG/1
TABLET ORAL
Qty: 90 TABLET | Refills: 3 | Status: SHIPPED | OUTPATIENT
Start: 2023-06-13 | End: 2024-07-02

## 2023-07-15 ENCOUNTER — HEALTH MAINTENANCE LETTER (OUTPATIENT)
Age: 63
End: 2023-07-15

## 2023-08-09 ENCOUNTER — OFFICE VISIT (OUTPATIENT)
Dept: INTERNAL MEDICINE | Facility: CLINIC | Age: 63
End: 2023-08-09
Payer: COMMERCIAL

## 2023-08-09 VITALS
BODY MASS INDEX: 23.21 KG/M2 | SYSTOLIC BLOOD PRESSURE: 132 MMHG | HEIGHT: 67 IN | WEIGHT: 147.9 LBS | OXYGEN SATURATION: 98 % | DIASTOLIC BLOOD PRESSURE: 68 MMHG | TEMPERATURE: 97.6 F | HEART RATE: 75 BPM

## 2023-08-09 DIAGNOSIS — Z00.00 ENCOUNTER FOR GENERAL ADULT MEDICAL EXAMINATION W/O ABNORMAL FINDINGS: Primary | ICD-10-CM

## 2023-08-09 DIAGNOSIS — Z12.11 SCREEN FOR COLON CANCER: ICD-10-CM

## 2023-08-09 DIAGNOSIS — Z12.5 SCREENING FOR PROSTATE CANCER: ICD-10-CM

## 2023-08-09 DIAGNOSIS — E11.9 TYPE 2 DIABETES MELLITUS WITHOUT COMPLICATION, WITHOUT LONG-TERM CURRENT USE OF INSULIN (H): ICD-10-CM

## 2023-08-09 DIAGNOSIS — Z87.891 PERSONAL HISTORY OF TOBACCO USE: ICD-10-CM

## 2023-08-09 LAB
ANION GAP SERPL CALCULATED.3IONS-SCNC: 9 MMOL/L (ref 7–15)
BUN SERPL-MCNC: 10.5 MG/DL (ref 8–23)
CALCIUM SERPL-MCNC: 9.9 MG/DL (ref 8.8–10.2)
CHLORIDE SERPL-SCNC: 100 MMOL/L (ref 98–107)
CHOLEST SERPL-MCNC: 169 MG/DL
CREAT SERPL-MCNC: 0.83 MG/DL (ref 0.67–1.17)
CREAT UR-MCNC: 2.7 MG/DL
DEPRECATED HCO3 PLAS-SCNC: 27 MMOL/L (ref 22–29)
GFR SERPL CREATININE-BSD FRML MDRD: >90 ML/MIN/1.73M2
GLUCOSE SERPL-MCNC: 90 MG/DL (ref 70–99)
HBA1C MFR BLD: 6.5 % (ref 0–5.6)
HDLC SERPL-MCNC: 62 MG/DL
LDLC SERPL CALC-MCNC: 80 MG/DL
MICROALBUMIN UR-MCNC: <12 MG/L
MICROALBUMIN/CREAT UR: NORMAL MG/G{CREAT}
NONHDLC SERPL-MCNC: 107 MG/DL
POTASSIUM SERPL-SCNC: 4.7 MMOL/L (ref 3.4–5.3)
PSA SERPL DL<=0.01 NG/ML-MCNC: 1.34 NG/ML (ref 0–4.5)
SODIUM SERPL-SCNC: 136 MMOL/L (ref 136–145)
TRIGL SERPL-MCNC: 137 MG/DL

## 2023-08-09 PROCEDURE — 83036 HEMOGLOBIN GLYCOSYLATED A1C: CPT | Performed by: INTERNAL MEDICINE

## 2023-08-09 PROCEDURE — 80048 BASIC METABOLIC PNL TOTAL CA: CPT | Performed by: INTERNAL MEDICINE

## 2023-08-09 PROCEDURE — G0103 PSA SCREENING: HCPCS | Performed by: INTERNAL MEDICINE

## 2023-08-09 PROCEDURE — 82570 ASSAY OF URINE CREATININE: CPT | Performed by: INTERNAL MEDICINE

## 2023-08-09 PROCEDURE — 82043 UR ALBUMIN QUANTITATIVE: CPT | Performed by: INTERNAL MEDICINE

## 2023-08-09 PROCEDURE — 99213 OFFICE O/P EST LOW 20 MIN: CPT | Mod: 25 | Performed by: INTERNAL MEDICINE

## 2023-08-09 PROCEDURE — 36415 COLL VENOUS BLD VENIPUNCTURE: CPT | Performed by: INTERNAL MEDICINE

## 2023-08-09 PROCEDURE — 99396 PREV VISIT EST AGE 40-64: CPT | Performed by: INTERNAL MEDICINE

## 2023-08-09 PROCEDURE — 80061 LIPID PANEL: CPT | Performed by: INTERNAL MEDICINE

## 2023-08-09 PROCEDURE — G0296 VISIT TO DETERM LDCT ELIG: HCPCS | Performed by: INTERNAL MEDICINE

## 2023-08-09 ASSESSMENT — ENCOUNTER SYMPTOMS
DIZZINESS: 0
DYSURIA: 0
JOINT SWELLING: 0
MYALGIAS: 1
NAUSEA: 0
NERVOUS/ANXIOUS: 0
SHORTNESS OF BREATH: 0
DIARRHEA: 0
SORE THROAT: 0
HEMATURIA: 0
ARTHRALGIAS: 1
HEARTBURN: 0
CHILLS: 0
FEVER: 0
HEMATOCHEZIA: 0
WEAKNESS: 0
EYE PAIN: 0
COUGH: 1
PALPITATIONS: 0
ABDOMINAL PAIN: 0
HEADACHES: 0
PARESTHESIAS: 0
CONSTIPATION: 0
FREQUENCY: 0

## 2023-08-09 NOTE — PROGRESS NOTES
SUBJECTIVE:   CC: Lencho is an 63 year old who presents for preventative health visit.       Healthy Habits:     Getting at least 3 servings of Calcium per day:  Yes    Bi-annual eye exam:  Yes    Dental care twice a year:  Yes    Sleep apnea or symptoms of sleep apnea:  None    Diet:  Regular (no restrictions)    Frequency of exercise:  4-5 days/week    Duration of exercise:  Greater than 60 minutes    Taking medications regularly:  Yes    Medication side effects:  None    Additional concerns today:  No      Today's PHQ-2 Score:       2023     9:07 AM   PHQ-2 (  [employer])   Q1: Little interest or pleasure in doing things 0   Q2: Feeling down, depressed or hopeless 0   PHQ-2 Score 0   Q1: Little interest or pleasure in doing things Not at all   Q2: Feeling down, depressed or hopeless Not at all   PHQ-2 Score 0             Social History     Tobacco Use    Smoking status: Every Day     Packs/day: 0.50     Years: 15.00     Pack years: 7.50     Types: Cigarettes     Last attempt to quit: 2014     Years since quittin.4    Smokeless tobacco: Never   Substance Use Topics    Alcohol use: Yes     Alcohol/week: 0.0 standard drinks of alcohol     Comment: Socially             2023     9:07 AM   Alcohol Use   Prescreen: >3 drinks/day or >7 drinks/week? Yes   AUDIT SCORE  4       Last PSA:   PSA   Date Value Ref Range Status   2020 0.59 0 - 4 ug/L Final     Comment:     Assay Method:  Chemiluminescence using Siemens Vista analyzer     Prostate Specific Antigen Screen   Date Value Ref Range Status   2022 0.47 0.00 - 4.00 ug/L Final       Reviewed orders with patient. Reviewed health maintenance and updated orders accordingly - Yes      Reviewed and updated as needed this visit by clinical staff   Tobacco  Allergies  Meds              Reviewed and updated as needed this visit by Provider                     Still having some intermittent wheezing, which he attributes to seasonal exposures.   "Continues to smoke heavily, has been a pack-a-day smoker for upwards of 40 years.    Continues on stable therapy for type 2 diabetes and is due for surveillance labs today.        Review of Systems   Constitutional:  Negative for chills and fever.   HENT:  Positive for congestion. Negative for ear pain, hearing loss and sore throat.    Eyes:  Negative for pain and visual disturbance.   Respiratory:  Positive for cough. Negative for shortness of breath.    Cardiovascular:  Negative for chest pain, palpitations and peripheral edema.   Gastrointestinal:  Negative for abdominal pain, constipation, diarrhea, heartburn, hematochezia and nausea.   Genitourinary:  Negative for dysuria, frequency, genital sores, hematuria, impotence, penile discharge and urgency.   Musculoskeletal:  Positive for arthralgias and myalgias. Negative for joint swelling.   Skin:  Positive for rash.   Neurological:  Negative for dizziness, weakness, headaches and paresthesias.   Psychiatric/Behavioral:  Negative for mood changes. The patient is not nervous/anxious.          OBJECTIVE:   /68   Pulse 75   Temp 97.6  F (36.4  C)   Ht 1.695 m (5' 6.75\")   Wt 67.1 kg (147 lb 14.4 oz)   SpO2 98%   BMI 23.34 kg/m      Physical Exam  GENERAL: healthy, alert and no distress  NECK: no adenopathy, no asymmetry, masses, or scars and thyroid normal to palpation  RESP: Reduced air movement throughout, some scant expiratory wheezing in the lower lung fields  CV: regular rate and rhythm, normal S1 S2, no S3 or S4, no murmur, click or rub, no peripheral edema and peripheral pulses strong  ABDOMEN: soft, nontender, no hepatosplenomegaly, no masses and bowel sounds normal  MS: no gross musculoskeletal defects noted, no edema        ASSESSMENT/PLAN:   Encounter for general adult medical examination w/o abnormal findings  Discussed cardiac disease risk factor modification including screening for and treating HTN, lipids, DM, and smoking cessation.  Also " discussed age appropriate cancer screening recommendations including testicular, prostate, colon and lung cancer as dictated by age group.  Recommended low fat, low salt diet and moderation in any alcohol intake.  Recommended always using seatbelts when in a car.  Recommended never driving after drinking or riding with someone who has been drinking as well.     Type 2 diabetes mellitus without complication, without long-term current use of insulin (H)  Has been reasonably well-controlled, recheck surveillance labs today  - HEMOGLOBIN A1C; Future  - BASIC METABOLIC PANEL; Future  - Lipid panel reflex to direct LDL Non-fasting; Future  - Albumin Random Urine Quantitative with Creat Ratio; Future  - HEMOGLOBIN A1C  - BASIC METABOLIC PANEL  - Lipid panel reflex to direct LDL Non-fasting  - Albumin Random Urine Quantitative with Creat Ratio    Screen for colon cancer  Agrees to repeat screening  - Colonoscopy Screening  Referral; Future    Screening for prostate cancer    - PSA, screen; Future  - PSA, screen    Personal history of tobacco use  Agrees to screening CT scan, and will also check formal pulmonary function testing.  - Prof fee: Shared Decision Making for Lung Cancer Screening  - CT Chest Lung Cancer Scrn Low Dose wo; Future  - General PFT Lab (Please always keep checked); Future  - Pulmonary Function Test; Future      Patient has been advised of split billing requirements and indicates understanding: Yes      COUNSELING:   Reviewed preventive health counseling, as reflected in patient instructions        He reports that he has been smoking cigarettes. He has a 7.50 pack-year smoking history. He has never used smokeless tobacco.            Jose Maxwell MD  [facility]

## 2023-08-09 NOTE — PATIENT INSTRUCTIONS
- [facility] will contact you to schedule your CT scan.  Please contact us if you do not hear from them.    - [facility] will contact you to schedule your colonoscopy.  Please contact us if you do not hear from them.     - Pulmonary Lab will contact you to schedule your pulmonary function tests.  Please contact us if you do not hear from them.     - I will contact you with lab results from today.       Lung Cancer Screening   Frequently Asked Questions  If you are at high-risk for lung cancer, getting screened with low-dose computed tomography (LDCT) every year can help save your life. This handout offers answers to some of the most common questions about lung cancer screening. If you have other questions, please call [phone] ([phone]).     What is it?  Lung cancer screening uses special X-ray technology to create an image of your lung tissue. The exam is quick and easy and takes less than 10 seconds. We don t give you any medicine or use any needles. You can eat before and after the exam. You don t need to change your clothes as long as the clothing on your chest doesn t contain metal. But, you do need to be able to hold your breath for at least 6 seconds during the exam.    What is the goal of lung cancer screening?  The goal of lung cancer screening is to save lives. Many times, lung cancer is not found until a person starts having physical symptoms. Lung cancer screening can help detect lung cancer in the earliest stages when it may be easier to treat.    Who should be screened for lung cancer?  We suggest lung cancer screening for anyone who is at high-risk for lung cancer. You are in the high-risk group if you:     are between the ages of 55 and 79, and   have smoked at least 1 pack of cigarettes a day for 20 or more years, and   still smoke or have quit within the past 15 years.    However, if you have a new cough or shortness of breath, you should talk to your doctor  before being screened.    Why does it matter if I have symptoms?  Certain symptoms can be a sign that you have a condition in your lungs that should be checked and treated by your doctor. These symptoms include fever, chest pain, a new or changing cough, shortness of breath that you have never felt before, coughing up blood or unexplained weight loss. Having any of these symptoms can greatly affect the results of lung cancer screening.       Should all smokers get an LDCT lung cancer screening exam?  It depends. Lung cancer screening is for a very specific group of men and women who have a history of heavy smoking over a long period of time (see  Who should be screened for lung cancer  above).  I am in the high-risk group, but have been diagnosed with cancer in the past. Is LDCT lung cancer screening right for me?  In some cases, you should not have LDCT lung screening, such as when your doctor is already following your cancer with CT scan studies. Your doctor will help you decide if LDCT lung screening is right for you.  Do I need to have a screening exam every year?  Yes. If you are in the high-risk group described earlier, you should get an LDCT lung cancer screening exam every year until you are 79, or are no longer willing or able to undergo screening and possible procedures to diagnose and treat lung cancer.  How effective is LDCT at preventing death from lung cancer?  Studies have shown that LDCT lung cancer screening can lower the risk of death from lung cancer by 20 percent in people who are at high-risk.  What are the risks?  There are some risks and limitations of LDCT lung cancer screening. We want to make sure you understand the risks and benefits, so please let us know if you have any questions. Your doctor may want to talk with you more about these risks.   Radiation exposure: As with any exam that uses radiation, there is a very small increased risk of cancer. The amount of radiation in LDCT is  small--about the same amount a person would get from a mammogram. Your doctor orders the exam when he or she feels the potential benefits outweigh the risks.   False negatives: No test is perfect, including LDCT. It is possible that you may have a medical condition, including lung cancer, that is not found during your exam. This is called a false negative result.   False positives and more testing: LDCT very often finds something in the lung that could be cancer, but in fact is not. This is called a false positive result. False positive tests often cause anxiety. To make sure these findings are not cancer, you may need to have more tests. These tests will be done only if you give us permission. Sometimes patients need a treatment that can have side effects, such as a biopsy. For more information on false positives, see  What can I expect from the results?    Findings not related to lung cancer: Your LDCT exam also takes pictures of areas of your body next to your lungs. In a very small number of cases, the CT scan will show an abnormal finding in one of these areas, such as your kidneys, adrenal glands, liver or thyroid. This finding may not be serious, but you may need more tests. Your doctor can help you decide what other tests you may need, if any.  What can I expect from the results?  About 1 out of 4 LDCT exams will find something that may need more tests. Most of the time, these findings are lung nodules. Lung nodules are very small collections of tissue in the lung. These nodules are very common, and the vast majority--more than 97 percent--are not cancer (benign). Most are normal lymph nodes or small areas of scarring from past infections.  But, if a small lung nodule is found to be cancer, the cancer can be cured more than 90 percent of the time. To know if the nodule is cancer, we may need to get more images before your next yearly screening exam. If the nodule has suspicious features (for example, it is  large, has an odd shape or grows over time), we will refer you to a specialist for further testing.  Will my doctor also get the results?  Yes. Your doctor will get a copy of your results.  Is it okay to keep smoking now that there s a cancer screening exam?  No. Tobacco is one of the strongest cancer-causing agents. It causes not only lung cancer, but other cancers and cardiovascular (heart) diseases as well. The damage caused by smoking builds over time. This means that the longer you smoke, the higher your risk of disease. While it is never too late to quit, the sooner you quit, the better.  Where can I find help to quit smoking?  The best way to prevent lung cancer is to stop smoking. If you have already quit smoking, congratulations and keep it up! For help on quitting smoking, please call [employer] at 9-021-QUITNOW (1-972.462.3373) or the American [employer] at 1-465.324.1794 to find local resources near you.  One-on-one health coaching:  If you d prefer to work individually with a health care provider on tobacco cessation, we offer:     Medication Therapy Management:  Our specially trained pharmacists work closely with you and your doctor to help you quit smoking.  Call [phone] or [phone] (toll free).

## 2023-08-09 NOTE — PROGRESS NOTES
Lung Cancer Screening Shared Decision Making Visit     Lencho Patel, a 63 year old male, is eligible for lung cancer screening    History   Smoking Status     Every Day     Packs/day: 0.50     Years: 15.00     Types: Cigarettes     Last attempt to quit: 2/19/2014   Smokeless Tobacco     Never       I have discussed with patient the risks and benefits of screening for lung cancer with low-dose CT.     The risks include:    radiation exposure: one low dose chest CT has as much ionizing radiation as about 15 chest x-rays, or 6 months of background radiation living in [place]      false positives: most findings/nodules are NOT cancer, but some might still require additional diagnostic evaluation, including biopsy    over-diagnosis: some slow growing cancers that might never have been clinically significant will be detected and treated unnecessarily     The benefit of early detection of lung cancer is contingent upon adherence to annual screening or more frequent follow up if indicated.     Furthermore, to benefit from screening, Lencho must be willing and able to undergo diagnostic procedures, if indicated. Although no specific guide is available for determining severity of comorbidities, it is reasonable to withhold screening in patients who have greater mortality risk from other diseases.     We did discuss that the best way to prevent lung cancer is to not smoke.    Some patients may value a numeric estimation of lung cancer risk when evaluating if lung cancer screening is right for them, here is one calculator:    ShouldIScreen

## 2023-08-30 ENCOUNTER — OFFICE VISIT (OUTPATIENT)
Dept: URGENT CARE | Facility: URGENT CARE | Age: 63
End: 2023-08-30
Payer: COMMERCIAL

## 2023-08-30 VITALS
SYSTOLIC BLOOD PRESSURE: 133 MMHG | BODY MASS INDEX: 23.86 KG/M2 | DIASTOLIC BLOOD PRESSURE: 71 MMHG | OXYGEN SATURATION: 98 % | HEART RATE: 78 BPM | TEMPERATURE: 97.8 F | WEIGHT: 151.2 LBS

## 2023-08-30 DIAGNOSIS — L23.7 CONTACT DERMATITIS DUE TO POISON IVY: Primary | ICD-10-CM

## 2023-08-30 PROCEDURE — 99213 OFFICE O/P EST LOW 20 MIN: CPT | Performed by: NURSE PRACTITIONER

## 2023-08-30 NOTE — PATIENT INSTRUCTIONS
Pine tar soap.     Ice to any areas     Zyrtec or Allegra daily and then you may take 25mg Diphenhydramine at bedtime as needed.

## 2023-08-30 NOTE — PROGRESS NOTES
Chief Complaint   Patient presents with    Poison Ivy     Itchy, rash to bilateral forearms X1 day.         ICD-10-CM    1. Contact dermatitis due to poison ivy  L23.7       Daily antihistamine, calamine, hydrocortisone cream.  Return if symptoms significantly worsen.    Subjective     Lencho Patel is an 63 year old male who presents to clinic today for itching from possible poison ivy.       ROS: 10 point ROS neg other than the symptoms noted above in the HPI.       Objective    /71   Pulse 78   Temp 97.8  F (36.6  C) (Tympanic)   Wt 68.6 kg (151 lb 3.2 oz)   SpO2 98%   BMI 23.86 kg/m    Nurses notes and VS have been reviewed.    Physical Exam       GENERAL APPEARANCE: healthy appearing, alert     EYES: PERRL, EOMI, sclera non-icteric     MS: extremities normal- no gross deformities noted; normal muscle tone.     SKIN: Multiple red papules and vesicles on the forearms    JHONATAN Morelos, CNP  [facility] Provider    The use of Dragon/[employer] dictation services may have been used to construct the content in this note; any grammatical or spelling errors are non-intentional. Please contact the author of this note directly if you are in need of any clarification.

## 2023-10-27 ENCOUNTER — TRANSFERRED RECORDS (OUTPATIENT)
Dept: HEALTH INFORMATION MANAGEMENT | Facility: CLINIC | Age: 63
End: 2023-10-27
Payer: COMMERCIAL

## 2023-12-02 ENCOUNTER — OFFICE VISIT (OUTPATIENT)
Dept: URGENT CARE | Facility: URGENT CARE | Age: 63
End: 2023-12-02
Payer: COMMERCIAL

## 2023-12-02 VITALS
TEMPERATURE: 97.9 F | RESPIRATION RATE: 18 BRPM | BODY MASS INDEX: 23.45 KG/M2 | DIASTOLIC BLOOD PRESSURE: 89 MMHG | OXYGEN SATURATION: 98 % | SYSTOLIC BLOOD PRESSURE: 149 MMHG | WEIGHT: 148.6 LBS | HEART RATE: 75 BPM

## 2023-12-02 DIAGNOSIS — S61.412A LACERATION OF LEFT HAND WITHOUT FOREIGN BODY, INITIAL ENCOUNTER: Primary | ICD-10-CM

## 2023-12-02 PROCEDURE — 90471 IMMUNIZATION ADMIN: CPT | Performed by: NURSE PRACTITIONER

## 2023-12-02 PROCEDURE — 90714 TD VACC NO PRESV 7 YRS+ IM: CPT | Performed by: NURSE PRACTITIONER

## 2023-12-02 PROCEDURE — 12001 RPR S/N/AX/GEN/TRNK 2.5CM/<: CPT | Mod: LT | Performed by: NURSE PRACTITIONER

## 2023-12-02 RX ORDER — CEPHALEXIN 500 MG/1
500 CAPSULE ORAL 3 TIMES DAILY
Qty: 21 CAPSULE | Refills: 0 | Status: SHIPPED | OUTPATIENT
Start: 2023-12-02

## 2023-12-02 NOTE — PATIENT INSTRUCTIONS
Start antibiotics today    Leave initial dressing on for 24 hours then you may remove it and shower normally, pat the area dry apply a small amount of antibiotic ointment and a bandage.    Do this for the next 3 days.    On days 4 and 5 after injury shower pat dry normally and just put a dry bandage over it.  On day 6 you may leave it open to air and return on day 7 for suture removal.    No submerging your hand in any type of water until sutures have been completely removed.

## 2023-12-02 NOTE — LETTER
December 2, 2023      Lencho CHOWDHURY Jorge  [address] [place]  [facility] [place] [address]        To Whom It May Concern:    Lencho Patel  was seen on 12/2/2023.  Please excuse him  until 12/9/2023 due to injury from doing any work with the left hand.        Sincerely,        KAYLA BATRES, CNP

## 2023-12-02 NOTE — PROGRESS NOTES
Assessment:       ICD-10-CM    1. Laceration of left hand without foreign body, initial encounter  S61.412A TD,PF 7+(TENIVAC)      2. Puncture wound of left hand without foreign body, initial encounter  S61.432A cephALEXin (KEFLEX) 500 MG capsule           Plan:    Imaging of the injured area for foreign body or fracture was not  Indicated    Wound closed per procedure note below.  Tetanus updated     Discussed home wound care. Return to  with increased swelling, pain, redness, pus or fevers.  Follow up for Staple removal in 7 days.     Patient was discharged in stable condition.  Patient verbalized understanding and agreed with this plan.    HPI: Lencho Patel is an 63 year old male that presents to clinic after cutting self on the left hand with a screwdriver.  Denies numbness of the left hand.  Denies dysfunction of the left hand. Patient denies any loss of strength to the left hand.  Patient is not up to date on tetanus.       Vitals reviewed by LEXI BATRES CNP  BP (!) 149/89 (BP Location: Right arm, Patient Position: Sitting, Cuff Size: Adult Regular)   Pulse 75   Temp 97.9  F (36.6  C) (Tympanic)   Resp 18   Wt 67.4 kg (148 lb 9.6 oz)   SpO2 98%   BMI 23.45 kg/m      Physical Exam:    Physical Exam      Procedure Note - Wound Repair:  Procedure performed by Lexi Saab     Anesthesia was obtained with 1% plain lidocaine via Local.  The wound, located on the left hand, measured 2.4 cm and was ragged edges and star-shaped.  The level of complexity was: simple .  The neurovascular exam was normal.  It was cleaned with surgiscrub, irrigated with normal saline and explored.  The wound was closed using 4-0 Ethylon interrupted. A small amount of antibiotic ointment was used and dressing was then applied    LEXI BATRES CNP 12:23 PM

## 2023-12-13 ENCOUNTER — OFFICE VISIT (OUTPATIENT)
Dept: URGENT CARE | Facility: URGENT CARE | Age: 63
End: 2023-12-13
Payer: COMMERCIAL

## 2023-12-13 VITALS
TEMPERATURE: 98.1 F | HEART RATE: 77 BPM | SYSTOLIC BLOOD PRESSURE: 151 MMHG | OXYGEN SATURATION: 99 % | DIASTOLIC BLOOD PRESSURE: 92 MMHG

## 2023-12-13 DIAGNOSIS — S61.412A LACERATION OF LEFT HAND WITHOUT FOREIGN BODY, INITIAL ENCOUNTER: Primary | ICD-10-CM

## 2023-12-13 PROCEDURE — 99207 PR NO CHARGE LOS: CPT

## 2023-12-13 NOTE — PROGRESS NOTES
Subjective   Lencho is a 63 year old, presenting for the following health issues:  Urgent Care (Suture Removal Left hand - Suture place 12/02)      HPI               Review of Systems         Objective    BP (!) 151/92 (BP Location: Right arm, Patient Position: Sitting, Cuff Size: Adult Regular)   Pulse 77   Temp 98.1  F (36.7  C) (Tympanic)   SpO2 99%   There is no height or weight on file to calculate BMI.  Physical Exam

## 2023-12-13 NOTE — NURSING NOTE
Lencho Patel presents to the clinic today for removal of sutures.  The patient has had the sutures in place for 11 days.  There has been no history of infection or drainage.  4 sutures are seen located on the Right hand.  The wound is healing well with no signs of infection.  Tetanus status is up to date.   All sutures were easily removed today.  Routine wound care discussed.  The patient will follow up as needed.   Tia Chinchilla LPN

## 2024-01-04 DIAGNOSIS — E11.9 TYPE 2 DIABETES MELLITUS WITHOUT COMPLICATION, WITHOUT LONG-TERM CURRENT USE OF INSULIN (H): ICD-10-CM

## 2024-02-10 ENCOUNTER — HEALTH MAINTENANCE LETTER (OUTPATIENT)
Age: 64
End: 2024-02-10

## 2024-04-03 DIAGNOSIS — E11.9 TYPE 2 DIABETES MELLITUS WITHOUT COMPLICATION, WITHOUT LONG-TERM CURRENT USE OF INSULIN (H): ICD-10-CM

## 2024-07-02 DIAGNOSIS — E78.1 HYPERTRIGLYCERIDEMIA: ICD-10-CM

## 2024-07-02 DIAGNOSIS — E11.9 TYPE 2 DIABETES MELLITUS WITHOUT COMPLICATION, WITHOUT LONG-TERM CURRENT USE OF INSULIN (H): ICD-10-CM

## 2024-07-02 RX ORDER — FENOFIBRATE 160 MG/1
TABLET ORAL
Qty: 90 TABLET | Refills: 0 | Status: SHIPPED | OUTPATIENT
Start: 2024-07-02

## 2024-09-07 ENCOUNTER — HEALTH MAINTENANCE LETTER (OUTPATIENT)
Age: 64
End: 2024-09-07

## 2024-10-07 ENCOUNTER — MYC REFILL (OUTPATIENT)
Dept: INTERNAL MEDICINE | Facility: CLINIC | Age: 64
End: 2024-10-07
Payer: COMMERCIAL

## 2024-10-07 DIAGNOSIS — E11.9 TYPE 2 DIABETES MELLITUS WITHOUT COMPLICATION, WITHOUT LONG-TERM CURRENT USE OF INSULIN (H): ICD-10-CM

## 2024-10-07 DIAGNOSIS — J20.9 ACUTE BRONCHITIS WITH SYMPTOMS > 10 DAYS: ICD-10-CM

## 2024-10-07 DIAGNOSIS — E78.1 HYPERTRIGLYCERIDEMIA: ICD-10-CM

## 2024-10-08 RX ORDER — FENOFIBRATE 160 MG/1
TABLET ORAL
Qty: 90 TABLET | Refills: 0 | Status: SHIPPED | OUTPATIENT
Start: 2024-10-08

## 2024-10-08 RX ORDER — ALBUTEROL SULFATE 90 UG/1
INHALANT RESPIRATORY (INHALATION)
Qty: 6.7 G | Refills: 11 | Status: SHIPPED | OUTPATIENT
Start: 2024-10-08

## 2024-11-16 ENCOUNTER — HEALTH MAINTENANCE LETTER (OUTPATIENT)
Age: 64
End: 2024-11-16

## 2025-01-08 DIAGNOSIS — E78.1 HYPERTRIGLYCERIDEMIA: ICD-10-CM

## 2025-01-09 RX ORDER — FENOFIBRATE 160 MG/1
TABLET ORAL
Qty: 90 TABLET | Refills: 0 | Status: SHIPPED | OUTPATIENT
Start: 2025-01-09

## 2025-03-08 ENCOUNTER — HEALTH MAINTENANCE LETTER (OUTPATIENT)
Age: 65
End: 2025-03-08

## 2025-04-15 DIAGNOSIS — E78.1 HYPERTRIGLYCERIDEMIA: ICD-10-CM

## 2025-04-15 RX ORDER — FENOFIBRATE 160 MG/1
160 TABLET ORAL
Qty: 90 TABLET | Refills: 0 | Status: SHIPPED | OUTPATIENT
Start: 2025-04-15

## 2025-07-16 DIAGNOSIS — E78.1 HYPERTRIGLYCERIDEMIA: ICD-10-CM

## 2025-07-16 DIAGNOSIS — E11.9 TYPE 2 DIABETES MELLITUS WITHOUT COMPLICATION, WITHOUT LONG-TERM CURRENT USE OF INSULIN (H): ICD-10-CM

## 2025-07-17 RX ORDER — FENOFIBRATE 160 MG/1
160 TABLET ORAL DAILY
Qty: 90 TABLET | Refills: 0 | Status: SHIPPED | OUTPATIENT
Start: 2025-07-17